# Patient Record
Sex: FEMALE | Race: WHITE | Employment: UNEMPLOYED | URBAN - METROPOLITAN AREA
[De-identification: names, ages, dates, MRNs, and addresses within clinical notes are randomized per-mention and may not be internally consistent; named-entity substitution may affect disease eponyms.]

---

## 2017-01-10 DIAGNOSIS — J45.20 MILD INTERMITTENT ASTHMA WITHOUT COMPLICATION: ICD-10-CM

## 2017-01-10 RX ORDER — BUDESONIDE AND FORMOTEROL FUMARATE DIHYDRATE 80; 4.5 UG/1; UG/1
2 AEROSOL RESPIRATORY (INHALATION) 2 TIMES DAILY
Qty: 1 INHALER | Refills: 3 | Status: SHIPPED | OUTPATIENT
Start: 2017-01-10 | End: 2017-04-04

## 2017-01-16 RX ORDER — ACYCLOVIR 400 MG/1
400 TABLET ORAL PRN
Qty: 40 TABLET | Refills: 2 | Status: SHIPPED | OUTPATIENT
Start: 2017-01-16 | End: 2018-10-02

## 2017-02-13 ENCOUNTER — TELEPHONE (OUTPATIENT)
Dept: PRIMARY CARE CLINIC | Age: 67
End: 2017-02-13

## 2017-02-13 RX ORDER — CEFUROXIME AXETIL 500 MG/1
TABLET ORAL
Qty: 20 TABLET | Refills: 0 | Status: SHIPPED | OUTPATIENT
Start: 2017-02-13 | End: 2017-02-23

## 2017-03-06 ENCOUNTER — OFFICE VISIT (OUTPATIENT)
Dept: PRIMARY CARE CLINIC | Age: 67
End: 2017-03-06

## 2017-03-06 VITALS
WEIGHT: 208 LBS | HEART RATE: 89 BPM | OXYGEN SATURATION: 94 % | DIASTOLIC BLOOD PRESSURE: 84 MMHG | TEMPERATURE: 98.7 F | RESPIRATION RATE: 18 BRPM | HEIGHT: 65 IN | SYSTOLIC BLOOD PRESSURE: 134 MMHG | BODY MASS INDEX: 34.66 KG/M2

## 2017-03-06 DIAGNOSIS — J06.9 ACUTE UPPER RESPIRATORY INFECTION: ICD-10-CM

## 2017-03-06 DIAGNOSIS — E78.2 MIXED HYPERLIPIDEMIA: ICD-10-CM

## 2017-03-06 DIAGNOSIS — J45.20 MILD INTERMITTENT ASTHMA WITHOUT COMPLICATION: Primary | ICD-10-CM

## 2017-03-06 DIAGNOSIS — J30.1 NON-SEASONAL ALLERGIC RHINITIS DUE TO POLLEN: ICD-10-CM

## 2017-03-06 PROCEDURE — G8427 DOCREV CUR MEDS BY ELIG CLIN: HCPCS | Performed by: FAMILY MEDICINE

## 2017-03-06 PROCEDURE — G8419 CALC BMI OUT NRM PARAM NOF/U: HCPCS | Performed by: FAMILY MEDICINE

## 2017-03-06 PROCEDURE — 99214 OFFICE O/P EST MOD 30 MIN: CPT | Performed by: FAMILY MEDICINE

## 2017-03-06 PROCEDURE — 4040F PNEUMOC VAC/ADMIN/RCVD: CPT | Performed by: FAMILY MEDICINE

## 2017-03-06 PROCEDURE — 1036F TOBACCO NON-USER: CPT | Performed by: FAMILY MEDICINE

## 2017-03-06 PROCEDURE — G8484 FLU IMMUNIZE NO ADMIN: HCPCS | Performed by: FAMILY MEDICINE

## 2017-03-06 PROCEDURE — 1090F PRES/ABSN URINE INCON ASSESS: CPT | Performed by: FAMILY MEDICINE

## 2017-03-06 PROCEDURE — G8399 PT W/DXA RESULTS DOCUMENT: HCPCS | Performed by: FAMILY MEDICINE

## 2017-03-06 PROCEDURE — 1123F ACP DISCUSS/DSCN MKR DOCD: CPT | Performed by: FAMILY MEDICINE

## 2017-03-06 PROCEDURE — 3014F SCREEN MAMMO DOC REV: CPT | Performed by: FAMILY MEDICINE

## 2017-03-06 PROCEDURE — 3017F COLORECTAL CA SCREEN DOC REV: CPT | Performed by: FAMILY MEDICINE

## 2017-03-06 RX ORDER — LEVOFLOXACIN 500 MG/1
500 TABLET, FILM COATED ORAL DAILY
Qty: 10 TABLET | Refills: 0 | Status: SHIPPED | OUTPATIENT
Start: 2017-03-06 | End: 2017-03-16

## 2017-03-06 RX ORDER — PREDNISONE 10 MG/1
TABLET ORAL
Qty: 25 TABLET | Refills: 0 | Status: SHIPPED | OUTPATIENT
Start: 2017-03-06 | End: 2017-03-20

## 2017-03-06 ASSESSMENT — ENCOUNTER SYMPTOMS
DIARRHEA: 0
ABDOMINAL PAIN: 0
WHEEZING: 1
SHORTNESS OF BREATH: 1
CONSTIPATION: 0
COUGH: 1

## 2017-03-07 DIAGNOSIS — J30.1 NON-SEASONAL ALLERGIC RHINITIS DUE TO POLLEN: ICD-10-CM

## 2017-03-07 DIAGNOSIS — J45.20 MILD INTERMITTENT ASTHMA WITHOUT COMPLICATION: ICD-10-CM

## 2017-03-07 LAB
ALBUMIN SERPL-MCNC: 4.3 G/DL (ref 3.9–4.9)
ALP BLD-CCNC: 70 U/L (ref 40–130)
ALT SERPL-CCNC: 20 U/L (ref 0–33)
ANION GAP SERPL CALCULATED.3IONS-SCNC: 12 MEQ/L (ref 7–13)
AST SERPL-CCNC: 15 U/L (ref 0–35)
BASOPHILS ABSOLUTE: 0.1 K/UL (ref 0–0.2)
BASOPHILS RELATIVE PERCENT: 0.8 %
BILIRUB SERPL-MCNC: 0.2 MG/DL (ref 0–1.2)
BUN BLDV-MCNC: 10 MG/DL (ref 8–23)
CALCIUM SERPL-MCNC: 9.2 MG/DL (ref 8.6–10.2)
CHLORIDE BLD-SCNC: 102 MEQ/L (ref 98–107)
CO2: 24 MEQ/L (ref 22–29)
CREAT SERPL-MCNC: 0.57 MG/DL (ref 0.5–0.9)
EOSINOPHILS ABSOLUTE: 0.2 K/UL (ref 0–0.7)
EOSINOPHILS RELATIVE PERCENT: 1.5 %
GFR AFRICAN AMERICAN: >60
GFR NON-AFRICAN AMERICAN: >60
GLOBULIN: 2.4 G/DL (ref 2.3–3.5)
GLUCOSE BLD-MCNC: 92 MG/DL (ref 74–109)
HCT VFR BLD CALC: 38.8 % (ref 37–47)
HEMOGLOBIN: 12.8 G/DL (ref 12–16)
LYMPHOCYTES ABSOLUTE: 2.7 K/UL (ref 1–4.8)
LYMPHOCYTES RELATIVE PERCENT: 27.1 %
MCH RBC QN AUTO: 29.3 PG (ref 27–31.3)
MCHC RBC AUTO-ENTMCNC: 32.9 % (ref 33–37)
MCV RBC AUTO: 89.1 FL (ref 82–100)
MONOCYTES ABSOLUTE: 0.8 K/UL (ref 0.2–0.8)
MONOCYTES RELATIVE PERCENT: 7.5 %
NEUTROPHILS ABSOLUTE: 6.4 K/UL (ref 1.4–6.5)
NEUTROPHILS RELATIVE PERCENT: 63.1 %
PDW BLD-RTO: 13.8 % (ref 11.5–14.5)
PLATELET # BLD: 316 K/UL (ref 130–400)
POTASSIUM SERPL-SCNC: 4.1 MEQ/L (ref 3.5–5.1)
RBC # BLD: 4.35 M/UL (ref 4.2–5.4)
SEDIMENTATION RATE, ERYTHROCYTE: 23 MM (ref 0–30)
SODIUM BLD-SCNC: 138 MEQ/L (ref 132–144)
TOTAL PROTEIN: 6.7 G/DL (ref 6.4–8.1)
WBC # BLD: 10.1 K/UL (ref 4.8–10.8)

## 2017-03-11 LAB
2000687N OAK TREE IGE: 0.27 KU/L
ALLERGEN ASPERGILLUS ALTERNATA IGE: 0.16 KU/L
ALLERGEN ASPERGILLUS FUMIGATUS IGE: <0.1 KU/L
ALLERGEN BERMUDA GRASS IGE: <0.1 KU/L
ALLERGEN BIRCH IGE: 0.21 KU/L
ALLERGEN CAT DANDER IGE: 22.2 KU/L
ALLERGEN COMMON SHORT RAGWEED IGE: 0.54 KU/L
ALLERGEN COTTONWOOD: 0.18 KU/L
ALLERGEN COW MILK IGE: 0.12 KU/L
ALLERGEN DOG DANDER IGE: 5.3 KU/L
ALLERGEN ELM IGE: 0.3 KU/L
ALLERGEN ENGLISH PLANTAIN: 0.18 KU/L
ALLERGEN FUNGI/MOLD M.RACEMOSUS IGE: <0.1 KU/L
ALLERGEN GERMAN COCKROACH IGE: <0.1 KU/L
ALLERGEN HORMODENDRUM HORDEI IGE: <0.1 KU/L
ALLERGEN JOHNSON GRASS IGE: 0.12 KU/L
ALLERGEN MAPLE/BOX ELDER IGE: 0.17 KU/L
ALLERGEN MITE DUST FARINAE IGE: 3.58 KU/L
ALLERGEN MITE DUST PTERONYSSINUS IGE: 2.14 KU/L
ALLERGEN MOUNTAIN CEDAR: 0.17 KU/L
ALLERGEN MOUSE EPITHELIA IGE: <0.1 KU/L
ALLERGEN PEANUT (F13) IGE: 0.31 KU/L
ALLERGEN PECAN TREE IGE: 0.25 KU/L
ALLERGEN PENICILLIUM NOTATUM: <0.1 KU/L
ALLERGEN PERENNIAL RYE GRASS IGE: 0.48 KU/L
ALLERGEN ROUGH PIGWEED (W14) IGE: 0.11 KU/L
ALLERGEN RUSSIAN THISTLE IGE: 0.15 KU/L
ALLERGEN SEE NOTE: NORMAL
ALLERGEN SHEEP SORREL (W18) IGE: 0.14 KU/L
ALLERGEN TIMOTHY GRASS: 0.54 KU/L
ALLERGEN TREE SYCAMORE: <0.1 KU/L
ALLERGEN WALNUT TREE IGE: 0.26 KU/L
ALLERGEN WHITE MULBERRY TREE, IGE: <0.1 KU/L
ALLERGEN, TREE, WHITE ASH IGE: 0.19 KU/L
IGE: 295 KU/L

## 2017-03-14 ENCOUNTER — TELEPHONE (OUTPATIENT)
Dept: PRIMARY CARE CLINIC | Age: 67
End: 2017-03-14

## 2017-03-16 ENCOUNTER — HOSPITAL ENCOUNTER (OUTPATIENT)
Dept: CT IMAGING | Age: 67
Discharge: HOME | End: 2017-03-16
Payer: MEDICARE

## 2017-03-16 DIAGNOSIS — J45.20 MILD INTERMITTENT ASTHMA WITHOUT COMPLICATION: ICD-10-CM

## 2017-03-16 PROCEDURE — 71250 CT THORAX DX C-: CPT

## 2017-04-04 ENCOUNTER — OFFICE VISIT (OUTPATIENT)
Dept: PRIMARY CARE CLINIC | Age: 67
End: 2017-04-04

## 2017-04-04 VITALS
DIASTOLIC BLOOD PRESSURE: 84 MMHG | TEMPERATURE: 95.7 F | SYSTOLIC BLOOD PRESSURE: 122 MMHG | RESPIRATION RATE: 16 BRPM | HEART RATE: 85 BPM | WEIGHT: 215.9 LBS | HEIGHT: 65 IN | BODY MASS INDEX: 35.97 KG/M2 | OXYGEN SATURATION: 96 %

## 2017-04-04 DIAGNOSIS — J30.2 SEASONAL ALLERGIC RHINITIS, UNSPECIFIED ALLERGIC RHINITIS TRIGGER: ICD-10-CM

## 2017-04-04 DIAGNOSIS — E78.2 MIXED HYPERLIPIDEMIA: ICD-10-CM

## 2017-04-04 DIAGNOSIS — F32.0 MILD SINGLE CURRENT EPISODE OF MAJOR DEPRESSIVE DISORDER (HCC): ICD-10-CM

## 2017-04-04 DIAGNOSIS — J45.20 MILD INTERMITTENT ASTHMA WITHOUT COMPLICATION: ICD-10-CM

## 2017-04-04 DIAGNOSIS — M81.0 OSTEOPOROSIS: ICD-10-CM

## 2017-04-04 DIAGNOSIS — Z00.00 ANNUAL PHYSICAL EXAM: Primary | ICD-10-CM

## 2017-04-04 PROCEDURE — 1036F TOBACCO NON-USER: CPT | Performed by: FAMILY MEDICINE

## 2017-04-04 PROCEDURE — 1123F ACP DISCUSS/DSCN MKR DOCD: CPT | Performed by: FAMILY MEDICINE

## 2017-04-04 PROCEDURE — 3014F SCREEN MAMMO DOC REV: CPT | Performed by: FAMILY MEDICINE

## 2017-04-04 PROCEDURE — 1090F PRES/ABSN URINE INCON ASSESS: CPT | Performed by: FAMILY MEDICINE

## 2017-04-04 PROCEDURE — G8419 CALC BMI OUT NRM PARAM NOF/U: HCPCS | Performed by: FAMILY MEDICINE

## 2017-04-04 PROCEDURE — 4005F PHARM THX FOR OP RXD: CPT | Performed by: FAMILY MEDICINE

## 2017-04-04 PROCEDURE — 99214 OFFICE O/P EST MOD 30 MIN: CPT | Performed by: FAMILY MEDICINE

## 2017-04-04 PROCEDURE — G8399 PT W/DXA RESULTS DOCUMENT: HCPCS | Performed by: FAMILY MEDICINE

## 2017-04-04 PROCEDURE — G8427 DOCREV CUR MEDS BY ELIG CLIN: HCPCS | Performed by: FAMILY MEDICINE

## 2017-04-04 PROCEDURE — 4040F PNEUMOC VAC/ADMIN/RCVD: CPT | Performed by: FAMILY MEDICINE

## 2017-04-04 PROCEDURE — 3017F COLORECTAL CA SCREEN DOC REV: CPT | Performed by: FAMILY MEDICINE

## 2017-04-04 RX ORDER — FLUTICASONE FUROATE AND VILANTEROL 100; 25 UG/1; UG/1
POWDER RESPIRATORY (INHALATION) DAILY
COMMUNITY
End: 2017-04-04 | Stop reason: SDUPTHER

## 2017-04-04 RX ORDER — ALBUTEROL SULFATE 2.5 MG/3ML
2.5 SOLUTION RESPIRATORY (INHALATION) EVERY 6 HOURS PRN
COMMUNITY
End: 2017-04-04 | Stop reason: SDUPTHER

## 2017-04-04 RX ORDER — ALBUTEROL SULFATE 2.5 MG/3ML
2.5 SOLUTION RESPIRATORY (INHALATION) EVERY 6 HOURS PRN
Qty: 120 EACH | Refills: 3 | Status: SHIPPED | OUTPATIENT
Start: 2017-04-04 | End: 2017-11-29 | Stop reason: SDUPTHER

## 2017-04-04 RX ORDER — FLUTICASONE FUROATE AND VILANTEROL 100; 25 UG/1; UG/1
1 POWDER RESPIRATORY (INHALATION) DAILY
Qty: 1 EACH | Refills: 3 | Status: SHIPPED | OUTPATIENT
Start: 2017-04-04 | End: 2017-05-16

## 2017-04-04 RX ORDER — FEXOFENADINE HCL 180 MG/1
180 TABLET ORAL DAILY
Qty: 30 TABLET | Refills: 3 | Status: SHIPPED | OUTPATIENT
Start: 2017-04-04 | End: 2017-10-02

## 2017-04-04 ASSESSMENT — ENCOUNTER SYMPTOMS
RHINORRHEA: 0
SHORTNESS OF BREATH: 0
CHEST TIGHTNESS: 0
TROUBLE SWALLOWING: 0
DIFFICULTY BREATHING: 0
HEARTBURN: 0
COUGH: 0
CONSTIPATION: 0
HOARSE VOICE: 1
ABDOMINAL PAIN: 0
DIARRHEA: 0
SPUTUM PRODUCTION: 0
WHEEZING: 0

## 2017-04-24 ENCOUNTER — HOSPITAL ENCOUNTER (OUTPATIENT)
Dept: WOMENS IMAGING | Age: 67
Discharge: HOME | End: 2017-04-24
Payer: MEDICARE

## 2017-04-24 DIAGNOSIS — M81.0 OSTEOPOROSIS: ICD-10-CM

## 2017-04-24 PROCEDURE — 77080 DXA BONE DENSITY AXIAL: CPT

## 2017-05-16 ENCOUNTER — OFFICE VISIT (OUTPATIENT)
Dept: PRIMARY CARE CLINIC | Age: 67
End: 2017-05-16

## 2017-05-16 VITALS
OXYGEN SATURATION: 96 % | HEART RATE: 83 BPM | BODY MASS INDEX: 36.44 KG/M2 | WEIGHT: 218.7 LBS | DIASTOLIC BLOOD PRESSURE: 78 MMHG | TEMPERATURE: 96.5 F | HEIGHT: 65 IN | SYSTOLIC BLOOD PRESSURE: 128 MMHG

## 2017-05-16 DIAGNOSIS — T14.8XXA BRUISING: ICD-10-CM

## 2017-05-16 DIAGNOSIS — J45.20 MILD INTERMITTENT ASTHMA WITHOUT COMPLICATION: ICD-10-CM

## 2017-05-16 DIAGNOSIS — T14.8XXA BRUISING: Primary | ICD-10-CM

## 2017-05-16 DIAGNOSIS — E78.2 MIXED HYPERLIPIDEMIA: ICD-10-CM

## 2017-05-16 DIAGNOSIS — R60.9 PERIPHERAL EDEMA: ICD-10-CM

## 2017-05-16 LAB
APTT: 26.2 SEC (ref 21.6–35.4)
BASOPHILS ABSOLUTE: 0 K/UL (ref 0–0.2)
BASOPHILS RELATIVE PERCENT: 0.2 %
BILIRUBIN, POC: ABNORMAL
BLOOD URINE, POC: ABNORMAL
CLARITY, POC: ABNORMAL
COLOR, POC: YELLOW
EOSINOPHILS ABSOLUTE: 1.3 K/UL (ref 0–0.7)
EOSINOPHILS RELATIVE PERCENT: 15.8 %
GLUCOSE URINE, POC: ABNORMAL
HCT VFR BLD CALC: 37.1 % (ref 37–47)
HEMOGLOBIN: 11.7 G/DL (ref 12–16)
KETONES, POC: ABNORMAL
LEUKOCYTE EST, POC: ABNORMAL
LYMPHOCYTES ABSOLUTE: 2.2 K/UL (ref 1–4.8)
LYMPHOCYTES RELATIVE PERCENT: 26.1 %
MCH RBC QN AUTO: 29.2 PG (ref 27–31.3)
MCHC RBC AUTO-ENTMCNC: 31.7 % (ref 33–37)
MCV RBC AUTO: 92.1 FL (ref 82–100)
MONOCYTES ABSOLUTE: 0.7 K/UL (ref 0.2–0.8)
MONOCYTES RELATIVE PERCENT: 8 %
NEUTROPHILS ABSOLUTE: 4.2 K/UL (ref 1.4–6.5)
NEUTROPHILS RELATIVE PERCENT: 49.9 %
NITRITE, POC: ABNORMAL
PDW BLD-RTO: 14 % (ref 11.5–14.5)
PH, POC: 6
PLATELET # BLD: 283 K/UL (ref 130–400)
PROTEIN, POC: ABNORMAL
RBC # BLD: 4.03 M/UL (ref 4.2–5.4)
SPECIFIC GRAVITY, POC: 1.02
UROBILINOGEN, POC: ABNORMAL
WBC # BLD: 8.5 K/UL (ref 4.8–10.8)

## 2017-05-16 PROCEDURE — 4040F PNEUMOC VAC/ADMIN/RCVD: CPT | Performed by: FAMILY MEDICINE

## 2017-05-16 PROCEDURE — 3017F COLORECTAL CA SCREEN DOC REV: CPT | Performed by: FAMILY MEDICINE

## 2017-05-16 PROCEDURE — G8399 PT W/DXA RESULTS DOCUMENT: HCPCS | Performed by: FAMILY MEDICINE

## 2017-05-16 PROCEDURE — 3014F SCREEN MAMMO DOC REV: CPT | Performed by: FAMILY MEDICINE

## 2017-05-16 PROCEDURE — G8427 DOCREV CUR MEDS BY ELIG CLIN: HCPCS | Performed by: FAMILY MEDICINE

## 2017-05-16 PROCEDURE — 1090F PRES/ABSN URINE INCON ASSESS: CPT | Performed by: FAMILY MEDICINE

## 2017-05-16 PROCEDURE — G8419 CALC BMI OUT NRM PARAM NOF/U: HCPCS | Performed by: FAMILY MEDICINE

## 2017-05-16 PROCEDURE — 99214 OFFICE O/P EST MOD 30 MIN: CPT | Performed by: FAMILY MEDICINE

## 2017-05-16 PROCEDURE — 1123F ACP DISCUSS/DSCN MKR DOCD: CPT | Performed by: FAMILY MEDICINE

## 2017-05-16 PROCEDURE — 1036F TOBACCO NON-USER: CPT | Performed by: FAMILY MEDICINE

## 2017-05-16 PROCEDURE — 81002 URINALYSIS NONAUTO W/O SCOPE: CPT | Performed by: FAMILY MEDICINE

## 2017-05-16 RX ORDER — FUROSEMIDE 20 MG/1
20 TABLET ORAL DAILY
Qty: 30 TABLET | Refills: 0 | Status: SHIPPED | OUTPATIENT
Start: 2017-05-16 | End: 2017-10-02

## 2017-05-16 ASSESSMENT — ENCOUNTER SYMPTOMS
SHORTNESS OF BREATH: 0
WHEEZING: 0
COUGH: 0
BACK PAIN: 0
ABDOMINAL PAIN: 0
NAUSEA: 0
VOMITING: 0
CONSTIPATION: 0
RESPIRATORY NEGATIVE: 1
DIARRHEA: 0
SORE THROAT: 0
SINUS PRESSURE: 0

## 2017-05-16 ASSESSMENT — PATIENT HEALTH QUESTIONNAIRE - PHQ9
2. FEELING DOWN, DEPRESSED OR HOPELESS: 1
SUM OF ALL RESPONSES TO PHQ QUESTIONS 1-9: 1
1. LITTLE INTEREST OR PLEASURE IN DOING THINGS: 0
SUM OF ALL RESPONSES TO PHQ9 QUESTIONS 1 & 2: 1

## 2017-05-31 ENCOUNTER — CARE COORDINATION (OUTPATIENT)
Dept: CARE COORDINATION | Age: 67
End: 2017-05-31

## 2017-06-06 ENCOUNTER — OFFICE VISIT (OUTPATIENT)
Dept: PRIMARY CARE CLINIC | Age: 67
End: 2017-06-06

## 2017-06-06 VITALS
RESPIRATION RATE: 16 BRPM | SYSTOLIC BLOOD PRESSURE: 104 MMHG | OXYGEN SATURATION: 97 % | BODY MASS INDEX: 35.49 KG/M2 | HEART RATE: 87 BPM | TEMPERATURE: 97.4 F | HEIGHT: 65 IN | WEIGHT: 213 LBS | DIASTOLIC BLOOD PRESSURE: 66 MMHG

## 2017-06-06 DIAGNOSIS — F32.0 MILD SINGLE CURRENT EPISODE OF MAJOR DEPRESSIVE DISORDER (HCC): ICD-10-CM

## 2017-06-06 DIAGNOSIS — J18.0 BRONCHOPNEUMONIA: ICD-10-CM

## 2017-06-06 DIAGNOSIS — J45.20 MILD INTERMITTENT ASTHMA WITHOUT COMPLICATION: Primary | ICD-10-CM

## 2017-06-06 DIAGNOSIS — J30.81 CAT ALLERGIES: ICD-10-CM

## 2017-06-06 DIAGNOSIS — Z91.09 ENVIRONMENTAL ALLERGIES: ICD-10-CM

## 2017-06-06 PROCEDURE — 99214 OFFICE O/P EST MOD 30 MIN: CPT | Performed by: FAMILY MEDICINE

## 2017-06-06 RX ORDER — PREDNISONE 10 MG/1
TABLET ORAL
Refills: 0 | COMMUNITY
Start: 2017-05-30 | End: 2017-07-01

## 2017-06-06 RX ORDER — VILAZODONE HYDROCHLORIDE 20 MG/1
20 TABLET ORAL DAILY
Qty: 30 TABLET | Refills: 2
Start: 2017-06-06 | End: 2017-07-01

## 2017-06-06 RX ORDER — BUDESONIDE AND FORMOTEROL FUMARATE DIHYDRATE 160; 4.5 UG/1; UG/1
2 AEROSOL RESPIRATORY (INHALATION) 2 TIMES DAILY
Qty: 3 INHALER | Refills: 1 | Status: SHIPPED | OUTPATIENT
Start: 2017-06-06 | End: 2018-03-08

## 2017-06-06 RX ORDER — VENLAFAXINE HYDROCHLORIDE 37.5 MG/1
37.5 CAPSULE, EXTENDED RELEASE ORAL DAILY
Qty: 30 CAPSULE | Refills: 3 | Status: SHIPPED | OUTPATIENT
Start: 2017-06-06 | End: 2017-07-24

## 2017-06-06 RX ORDER — LEVOFLOXACIN 750 MG/1
TABLET ORAL
Refills: 0 | COMMUNITY
Start: 2017-05-30 | End: 2017-07-01

## 2017-06-06 RX ORDER — MONTELUKAST SODIUM 10 MG/1
10 TABLET ORAL DAILY
Qty: 90 TABLET | Refills: 1 | Status: SHIPPED | OUTPATIENT
Start: 2017-06-06 | End: 2017-12-07 | Stop reason: SDUPTHER

## 2017-06-06 ASSESSMENT — ENCOUNTER SYMPTOMS
BACK PAIN: 0
SHORTNESS OF BREATH: 0
CHOKING: 0

## 2017-07-01 ENCOUNTER — OFFICE VISIT (OUTPATIENT)
Dept: PRIMARY CARE CLINIC | Age: 67
End: 2017-07-01

## 2017-07-01 VITALS
DIASTOLIC BLOOD PRESSURE: 80 MMHG | WEIGHT: 216.7 LBS | HEART RATE: 81 BPM | SYSTOLIC BLOOD PRESSURE: 118 MMHG | BODY MASS INDEX: 36.1 KG/M2 | RESPIRATION RATE: 14 BRPM | HEIGHT: 65 IN | OXYGEN SATURATION: 97 % | TEMPERATURE: 97.9 F

## 2017-07-01 DIAGNOSIS — J30.1 NON-SEASONAL ALLERGIC RHINITIS DUE TO POLLEN: ICD-10-CM

## 2017-07-01 DIAGNOSIS — J45.20 MILD INTERMITTENT ASTHMA WITHOUT COMPLICATION: Primary | ICD-10-CM

## 2017-07-01 DIAGNOSIS — F32.0 MILD SINGLE CURRENT EPISODE OF MAJOR DEPRESSIVE DISORDER (HCC): ICD-10-CM

## 2017-07-01 PROCEDURE — 1090F PRES/ABSN URINE INCON ASSESS: CPT | Performed by: FAMILY MEDICINE

## 2017-07-01 PROCEDURE — 3014F SCREEN MAMMO DOC REV: CPT | Performed by: FAMILY MEDICINE

## 2017-07-01 PROCEDURE — 3017F COLORECTAL CA SCREEN DOC REV: CPT | Performed by: FAMILY MEDICINE

## 2017-07-01 PROCEDURE — 1123F ACP DISCUSS/DSCN MKR DOCD: CPT | Performed by: FAMILY MEDICINE

## 2017-07-01 PROCEDURE — G8427 DOCREV CUR MEDS BY ELIG CLIN: HCPCS | Performed by: FAMILY MEDICINE

## 2017-07-01 PROCEDURE — G8399 PT W/DXA RESULTS DOCUMENT: HCPCS | Performed by: FAMILY MEDICINE

## 2017-07-01 PROCEDURE — 4040F PNEUMOC VAC/ADMIN/RCVD: CPT | Performed by: FAMILY MEDICINE

## 2017-07-01 PROCEDURE — 99214 OFFICE O/P EST MOD 30 MIN: CPT | Performed by: FAMILY MEDICINE

## 2017-07-01 PROCEDURE — G8419 CALC BMI OUT NRM PARAM NOF/U: HCPCS | Performed by: FAMILY MEDICINE

## 2017-07-01 PROCEDURE — 1036F TOBACCO NON-USER: CPT | Performed by: FAMILY MEDICINE

## 2017-07-01 RX ORDER — FLUTICASONE PROPIONATE 50 MCG
1 SPRAY, SUSPENSION (ML) NASAL DAILY
Qty: 1 BOTTLE | Refills: 3 | Status: SHIPPED | OUTPATIENT
Start: 2017-07-01

## 2017-07-16 ASSESSMENT — ENCOUNTER SYMPTOMS
ABDOMINAL PAIN: 0
SHORTNESS OF BREATH: 0
CHEST TIGHTNESS: 0
COUGH: 0

## 2017-07-24 ENCOUNTER — OFFICE VISIT (OUTPATIENT)
Dept: PRIMARY CARE CLINIC | Age: 67
End: 2017-07-24

## 2017-07-24 VITALS
OXYGEN SATURATION: 98 % | WEIGHT: 216 LBS | SYSTOLIC BLOOD PRESSURE: 108 MMHG | RESPIRATION RATE: 16 BRPM | HEART RATE: 72 BPM | DIASTOLIC BLOOD PRESSURE: 64 MMHG | HEIGHT: 65 IN | BODY MASS INDEX: 35.99 KG/M2 | TEMPERATURE: 97.8 F

## 2017-07-24 DIAGNOSIS — J45.21 MILD INTERMITTENT ASTHMA WITH ACUTE EXACERBATION: Primary | ICD-10-CM

## 2017-07-24 DIAGNOSIS — J30.1 NON-SEASONAL ALLERGIC RHINITIS DUE TO POLLEN: ICD-10-CM

## 2017-07-24 DIAGNOSIS — J06.9 ACUTE UPPER RESPIRATORY INFECTION: ICD-10-CM

## 2017-07-24 DIAGNOSIS — F32.0 MILD SINGLE CURRENT EPISODE OF MAJOR DEPRESSIVE DISORDER (HCC): ICD-10-CM

## 2017-07-24 PROCEDURE — 99214 OFFICE O/P EST MOD 30 MIN: CPT | Performed by: FAMILY MEDICINE

## 2017-07-24 PROCEDURE — 4040F PNEUMOC VAC/ADMIN/RCVD: CPT | Performed by: FAMILY MEDICINE

## 2017-07-24 PROCEDURE — 1036F TOBACCO NON-USER: CPT | Performed by: FAMILY MEDICINE

## 2017-07-24 PROCEDURE — 1090F PRES/ABSN URINE INCON ASSESS: CPT | Performed by: FAMILY MEDICINE

## 2017-07-24 PROCEDURE — G8419 CALC BMI OUT NRM PARAM NOF/U: HCPCS | Performed by: FAMILY MEDICINE

## 2017-07-24 PROCEDURE — G8427 DOCREV CUR MEDS BY ELIG CLIN: HCPCS | Performed by: FAMILY MEDICINE

## 2017-07-24 PROCEDURE — 96372 THER/PROPH/DIAG INJ SC/IM: CPT | Performed by: FAMILY MEDICINE

## 2017-07-24 PROCEDURE — 1123F ACP DISCUSS/DSCN MKR DOCD: CPT | Performed by: FAMILY MEDICINE

## 2017-07-24 PROCEDURE — G8399 PT W/DXA RESULTS DOCUMENT: HCPCS | Performed by: FAMILY MEDICINE

## 2017-07-24 PROCEDURE — 3017F COLORECTAL CA SCREEN DOC REV: CPT | Performed by: FAMILY MEDICINE

## 2017-07-24 PROCEDURE — 3014F SCREEN MAMMO DOC REV: CPT | Performed by: FAMILY MEDICINE

## 2017-07-24 RX ORDER — PREDNISONE 10 MG/1
TABLET ORAL
Qty: 20 TABLET | Refills: 0 | Status: SHIPPED | OUTPATIENT
Start: 2017-07-24 | End: 2017-08-07

## 2017-07-24 RX ORDER — CEFUROXIME AXETIL 500 MG/1
TABLET ORAL
Qty: 20 TABLET | Refills: 0 | Status: SHIPPED | OUTPATIENT
Start: 2017-07-24 | End: 2017-08-03

## 2017-07-24 RX ORDER — CEFTRIAXONE 1 G/1
1 INJECTION, POWDER, FOR SOLUTION INTRAMUSCULAR; INTRAVENOUS ONCE
Status: COMPLETED | OUTPATIENT
Start: 2017-07-24 | End: 2017-07-24

## 2017-07-24 RX ADMIN — CEFTRIAXONE 1 G: 1 INJECTION, POWDER, FOR SOLUTION INTRAMUSCULAR; INTRAVENOUS at 16:31

## 2017-07-24 ASSESSMENT — ENCOUNTER SYMPTOMS
SORE THROAT: 0
SINUS PAIN: 0
CHEST TIGHTNESS: 1
VOMITING: 0
SHORTNESS OF BREATH: 1
CONSTIPATION: 0
BACK PAIN: 0
WHEEZING: 1
NAUSEA: 0
DIARRHEA: 0
RHINORRHEA: 0
SINUS PRESSURE: 0
SWOLLEN GLANDS: 0
ABDOMINAL PAIN: 0
COUGH: 1

## 2017-09-22 ENCOUNTER — OFFICE VISIT (OUTPATIENT)
Dept: PRIMARY CARE CLINIC | Age: 67
End: 2017-09-22

## 2017-09-22 VITALS
TEMPERATURE: 97.7 F | RESPIRATION RATE: 20 BRPM | HEART RATE: 88 BPM | DIASTOLIC BLOOD PRESSURE: 66 MMHG | BODY MASS INDEX: 37.15 KG/M2 | OXYGEN SATURATION: 95 % | SYSTOLIC BLOOD PRESSURE: 106 MMHG | HEIGHT: 65 IN | WEIGHT: 223 LBS

## 2017-09-22 DIAGNOSIS — J45.20 MILD INTERMITTENT ASTHMA WITHOUT COMPLICATION: Primary | ICD-10-CM

## 2017-09-22 DIAGNOSIS — E78.2 MIXED HYPERLIPIDEMIA: ICD-10-CM

## 2017-09-22 PROCEDURE — 1036F TOBACCO NON-USER: CPT | Performed by: FAMILY MEDICINE

## 2017-09-22 PROCEDURE — G8427 DOCREV CUR MEDS BY ELIG CLIN: HCPCS | Performed by: FAMILY MEDICINE

## 2017-09-22 PROCEDURE — 3017F COLORECTAL CA SCREEN DOC REV: CPT | Performed by: FAMILY MEDICINE

## 2017-09-22 PROCEDURE — G8399 PT W/DXA RESULTS DOCUMENT: HCPCS | Performed by: FAMILY MEDICINE

## 2017-09-22 PROCEDURE — 1090F PRES/ABSN URINE INCON ASSESS: CPT | Performed by: FAMILY MEDICINE

## 2017-09-22 PROCEDURE — 1123F ACP DISCUSS/DSCN MKR DOCD: CPT | Performed by: FAMILY MEDICINE

## 2017-09-22 PROCEDURE — 3014F SCREEN MAMMO DOC REV: CPT | Performed by: FAMILY MEDICINE

## 2017-09-22 PROCEDURE — G8417 CALC BMI ABV UP PARAM F/U: HCPCS | Performed by: FAMILY MEDICINE

## 2017-09-22 PROCEDURE — 4040F PNEUMOC VAC/ADMIN/RCVD: CPT | Performed by: FAMILY MEDICINE

## 2017-09-22 PROCEDURE — 99214 OFFICE O/P EST MOD 30 MIN: CPT | Performed by: FAMILY MEDICINE

## 2017-09-22 ASSESSMENT — ENCOUNTER SYMPTOMS
COUGH: 0
NAUSEA: 0
SORE THROAT: 0
DIARRHEA: 0
VOMITING: 0
BACK PAIN: 0
ABDOMINAL PAIN: 0
CONSTIPATION: 0
WHEEZING: 0
SINUS PRESSURE: 0

## 2017-09-27 ENCOUNTER — TELEPHONE (OUTPATIENT)
Dept: PRIMARY CARE CLINIC | Age: 67
End: 2017-09-27

## 2017-09-27 DIAGNOSIS — N28.89 LEFT RENAL MASS: Primary | ICD-10-CM

## 2017-09-30 ASSESSMENT — ENCOUNTER SYMPTOMS
CHEST TIGHTNESS: 0
SHORTNESS OF BREATH: 1

## 2017-10-02 ENCOUNTER — OFFICE VISIT (OUTPATIENT)
Dept: PRIMARY CARE CLINIC | Age: 67
End: 2017-10-02

## 2017-10-02 VITALS
DIASTOLIC BLOOD PRESSURE: 64 MMHG | SYSTOLIC BLOOD PRESSURE: 122 MMHG | RESPIRATION RATE: 18 BRPM | HEART RATE: 97 BPM | HEIGHT: 65 IN | OXYGEN SATURATION: 92 % | BODY MASS INDEX: 37.15 KG/M2 | WEIGHT: 223 LBS | TEMPERATURE: 99 F

## 2017-10-02 DIAGNOSIS — J45.21 MILD INTERMITTENT ASTHMA WITH ACUTE EXACERBATION: Primary | ICD-10-CM

## 2017-10-02 DIAGNOSIS — J30.1 NON-SEASONAL ALLERGIC RHINITIS DUE TO POLLEN: ICD-10-CM

## 2017-10-02 PROCEDURE — 3017F COLORECTAL CA SCREEN DOC REV: CPT | Performed by: FAMILY MEDICINE

## 2017-10-02 PROCEDURE — 1036F TOBACCO NON-USER: CPT | Performed by: FAMILY MEDICINE

## 2017-10-02 PROCEDURE — G8399 PT W/DXA RESULTS DOCUMENT: HCPCS | Performed by: FAMILY MEDICINE

## 2017-10-02 PROCEDURE — 99214 OFFICE O/P EST MOD 30 MIN: CPT | Performed by: FAMILY MEDICINE

## 2017-10-02 PROCEDURE — G8484 FLU IMMUNIZE NO ADMIN: HCPCS | Performed by: FAMILY MEDICINE

## 2017-10-02 PROCEDURE — G8427 DOCREV CUR MEDS BY ELIG CLIN: HCPCS | Performed by: FAMILY MEDICINE

## 2017-10-02 PROCEDURE — 1123F ACP DISCUSS/DSCN MKR DOCD: CPT | Performed by: FAMILY MEDICINE

## 2017-10-02 PROCEDURE — 4040F PNEUMOC VAC/ADMIN/RCVD: CPT | Performed by: FAMILY MEDICINE

## 2017-10-02 PROCEDURE — 3014F SCREEN MAMMO DOC REV: CPT | Performed by: FAMILY MEDICINE

## 2017-10-02 PROCEDURE — G8417 CALC BMI ABV UP PARAM F/U: HCPCS | Performed by: FAMILY MEDICINE

## 2017-10-02 PROCEDURE — 1090F PRES/ABSN URINE INCON ASSESS: CPT | Performed by: FAMILY MEDICINE

## 2017-10-02 PROCEDURE — 94640 AIRWAY INHALATION TREATMENT: CPT | Performed by: FAMILY MEDICINE

## 2017-10-02 RX ORDER — PREDNISONE 10 MG/1
TABLET ORAL
Refills: 4 | COMMUNITY
Start: 2017-09-22 | End: 2017-10-25

## 2017-10-02 RX ORDER — ALBUTEROL SULFATE 2.5 MG/3ML
2.5 SOLUTION RESPIRATORY (INHALATION) ONCE
Status: COMPLETED | OUTPATIENT
Start: 2017-10-02 | End: 2017-10-02

## 2017-10-02 RX ADMIN — ALBUTEROL SULFATE 2.5 MG: 2.5 SOLUTION RESPIRATORY (INHALATION) at 13:45

## 2017-10-02 ASSESSMENT — ENCOUNTER SYMPTOMS
ABDOMINAL PAIN: 0
SORE THROAT: 0
SHORTNESS OF BREATH: 1
HEMOPTYSIS: 0
RHINORRHEA: 0
NAUSEA: 0
DIARRHEA: 0
SINUS PRESSURE: 0
VOMITING: 0
WHEEZING: 1
CONSTIPATION: 0
SPUTUM PRODUCTION: 0
SWOLLEN GLANDS: 0
COUGH: 1
HEARTBURN: 0
BACK PAIN: 0

## 2017-10-02 NOTE — PROGRESS NOTES
Subjective:      Patient ID: Nirmal Dai is a 77 y.o. female who presents today for:  Chief Complaint   Patient presents with    Shortness of Breath     Patient c/o SOB since Saturday.  Cough     Patient c/o cough and wheezing       Shortness of Breath   This is a new problem. The current episode started in the past 7 days. The problem occurs constantly. The problem has been unchanged. Associated symptoms include wheezing. Pertinent negatives include no abdominal pain, chest pain, ear pain, fever, headaches, hemoptysis, leg pain, neck pain, rhinorrhea, sore throat, sputum production, swollen glands or vomiting. The symptoms are aggravated by weather changes. Treatments tried: Singulair, The treatment provided no relief. Her past medical history is significant for asthma. There is no history of bronchiolitis, CAD, chronic lung disease, a heart failure, PE, pneumonia or a recent surgery. Cough   This is a new problem. The current episode started in the past 7 days. The problem has been unchanged. Associated symptoms include shortness of breath and wheezing. Pertinent negatives include no chest pain, chills, ear pain, fever, headaches, heartburn, hemoptysis, myalgias, nasal congestion, postnasal drip, rhinorrhea or sore throat. Treatments tried: Symbacort. Her past medical history is significant for asthma. There is no history of bronchiectasis, bronchitis or pneumonia.        Past Medical History:   Diagnosis Date    Allergic rhinitis     Anemia     Asthma     Depression     Hyperlipidemia      Past Surgical History:   Procedure Laterality Date    COLONOSCOPY  09/17/2012    DILATION AND CURETTAGE OF UTERUS      TONSILLECTOMY AND ADENOIDECTOMY      TUBAL LIGATION       Family History   Problem Relation Age of Onset    Cancer Mother      breast    Emphysema Father      Social History     Social History    Marital status:      Spouse name: N/A    Number of children: N/A    Years of range of motion. She exhibits no edema or tenderness. Lymphadenopathy:     She has no cervical adenopathy. Neurological: She is alert and oriented to person, place, and time. She has normal reflexes. Coordination normal.   Skin: Skin is warm and dry. No rash noted. Psychiatric: She has a normal mood and affect. Thought content normal.   Nursing note and vitals reviewed. Assessment:     1. Mild intermittent asthma with acute exacerbation  predniSONE (DELTASONE) 10 MG tablet    90988 - CO INHAL RX, AIRWAY OBST/DX SPUTUM INDUCT    albuterol (PROVENTIL) nebulizer solution 2.5 mg   2. Non-seasonal allergic rhinitis due to pollen         Plan:      Orders Placed This Encounter   Procedures    95948 - CO INHAL RX, AIRWAY OBST/DX SPUTUM INDUCT     Orders Placed This Encounter   Medications    albuterol (PROVENTIL) nebulizer solution 2.5 mg       Controlled Substances Monitoring:      Return in about 3 weeks (around 10/23/2017) for Review of Labs & Diagnostic Testing, Routine follow up. I, Margette Canavan ([address] [place] [address])  , am scribing for and in the presence of Michela Orellana DO. Electronically signed by :  Margette Canavan ([address] [place] [address])      Sheree Cristina DO, personally performed the services described in this documentation, as scribed by Haily Diane in my presence, and it is both accurate and complete.  Electronically signed by: Michela Orellana DO    10/2/17 9:47 PM    Michela Orellana DO

## 2017-10-05 RX ORDER — DEXTROMETHORPHAN POLISTIREX 30 MG/5ML
60 SUSPENSION ORAL 2 TIMES DAILY PRN
Qty: 240 ML | Refills: 1 | Status: SHIPPED | OUTPATIENT
Start: 2017-10-05 | End: 2017-10-15

## 2017-10-12 ENCOUNTER — HOSPITAL ENCOUNTER (OUTPATIENT)
Dept: CT IMAGING | Age: 67
Discharge: HOME | End: 2017-10-12
Payer: MEDICARE

## 2017-10-12 DIAGNOSIS — J45.20 MILD INTERMITTENT ASTHMA WITHOUT COMPLICATION: ICD-10-CM

## 2017-10-12 PROCEDURE — 6360000004 HC RX CONTRAST MEDICATION: Performed by: RADIOLOGY

## 2017-10-12 PROCEDURE — 74177 CT ABD & PELVIS W/CONTRAST: CPT

## 2017-10-12 PROCEDURE — 71260 CT THORAX DX C+: CPT

## 2017-10-12 PROCEDURE — 2500000003 HC RX 250 WO HCPCS: Performed by: RADIOLOGY

## 2017-10-12 RX ADMIN — IOPAMIDOL 100 ML: 755 INJECTION, SOLUTION INTRAVENOUS at 10:02

## 2017-10-12 RX ADMIN — BARIUM SULFATE 450 ML: 21 SUSPENSION ORAL at 10:02

## 2017-10-13 ENCOUNTER — TELEPHONE (OUTPATIENT)
Dept: PRIMARY CARE CLINIC | Age: 67
End: 2017-10-13

## 2017-10-14 NOTE — TELEPHONE ENCOUNTER
I would recommend seeing the cardiologist and the pulmonologist before the colonoscopy as she'll need to be cleared for surgery/anesthesia for the colonoscopy.

## 2017-10-14 NOTE — TELEPHONE ENCOUNTER
Pt called back and is aware of imaging results. She has a follow up with cardiology, pulmonologist and Dr. Rebeca Nunez. She has to verify that she is stable enough to have a colonoscopy. Once she does, she will schedule with Dr. Marie Mazariegos at [facility], [facility]. herself.

## 2017-10-25 ENCOUNTER — OFFICE VISIT (OUTPATIENT)
Dept: PRIMARY CARE CLINIC | Age: 67
End: 2017-10-25

## 2017-10-25 VITALS
HEIGHT: 65 IN | BODY MASS INDEX: 37.32 KG/M2 | SYSTOLIC BLOOD PRESSURE: 120 MMHG | RESPIRATION RATE: 20 BRPM | WEIGHT: 224 LBS | DIASTOLIC BLOOD PRESSURE: 70 MMHG | HEART RATE: 80 BPM | TEMPERATURE: 98.6 F

## 2017-10-25 DIAGNOSIS — E78.2 MIXED HYPERLIPIDEMIA: ICD-10-CM

## 2017-10-25 DIAGNOSIS — J30.1 ACUTE NONSEASONAL ALLERGIC RHINITIS DUE TO POLLEN: ICD-10-CM

## 2017-10-25 DIAGNOSIS — J45.20 MILD INTERMITTENT ASTHMA WITHOUT COMPLICATION: Primary | ICD-10-CM

## 2017-10-25 DIAGNOSIS — F32.0 MILD SINGLE CURRENT EPISODE OF MAJOR DEPRESSIVE DISORDER (HCC): ICD-10-CM

## 2017-10-25 PROCEDURE — 99496 TRANSJ CARE MGMT HIGH F2F 7D: CPT | Performed by: FAMILY MEDICINE

## 2017-10-25 ASSESSMENT — ENCOUNTER SYMPTOMS
WHEEZING: 0
SHORTNESS OF BREATH: 1
SORE THROAT: 0
DIFFICULTY BREATHING: 1
CONSTIPATION: 0
BACK PAIN: 0
SINUS PRESSURE: 0
VOMITING: 0
ABDOMINAL PAIN: 0
NAUSEA: 0
DIARRHEA: 0
COUGH: 0

## 2017-10-25 NOTE — PROGRESS NOTES
Subjective:      Patient ID: Hector Haley is a 79 y.o. female who presents today for:  Chief Complaint   Patient presents with    Results     patient is here to discuss the results of her recent CT scans. states she had a chest and abdomen CT at [facility] and results are in Chart.  Follow-Up from Hospital     patient is here for a follow up after being in [facility] for asthma. Pneumonia   She complains of difficulty breathing (intermittently) and shortness of breath. There is no cough or wheezing. This is a new problem. The current episode started 1 to 4 weeks ago. The problem has been gradually improving. Pertinent negatives include no chest pain, ear pain, fever, headaches, malaise/fatigue, postnasal drip or sore throat. Her symptoms are aggravated by animal exposure and pollen. Risk factors for lung disease include animal exposure. Her past medical history is significant for asthma. There is no history of bronchitis, COPD, emphysema or pneumonia. Past Medical History:   Diagnosis Date    Allergic rhinitis     Anemia     Asthma     Depression     Hyperlipidemia      Past Surgical History:   Procedure Laterality Date    COLONOSCOPY  09/17/2012    DILATION AND CURETTAGE OF UTERUS      TONSILLECTOMY AND ADENOIDECTOMY      TUBAL LIGATION       Family History   Problem Relation Age of Onset    Cancer Mother      breast    Emphysema Father      Social History     Social History    Marital status:      Spouse name: N/A    Number of children: N/A    Years of education: N/A     Occupational History    Not on file. Social History Main Topics    Smoking status: Never Smoker    Smokeless tobacco: Never Used    Alcohol use Yes    Drug use: No    Sexual activity: Not on file     Other Topics Concern    Not on file     Social History Narrative    No narrative on file     Allergies:  Aleve [naproxen sodium]; Salma-seltzer [aspirin effervescent];  Ibuprofen; Lanolin; Morphine; and

## 2017-11-21 ENCOUNTER — OFFICE VISIT (OUTPATIENT)
Dept: PRIMARY CARE CLINIC | Age: 67
End: 2017-11-21

## 2017-11-21 VITALS
SYSTOLIC BLOOD PRESSURE: 92 MMHG | TEMPERATURE: 98.3 F | RESPIRATION RATE: 14 BRPM | HEIGHT: 65 IN | HEART RATE: 80 BPM | DIASTOLIC BLOOD PRESSURE: 58 MMHG | WEIGHT: 223 LBS | BODY MASS INDEX: 37.15 KG/M2

## 2017-11-21 DIAGNOSIS — E55.9 VITAMIN D DEFICIENCY: ICD-10-CM

## 2017-11-21 DIAGNOSIS — R73.9 HYPERGLYCEMIA: ICD-10-CM

## 2017-11-21 DIAGNOSIS — F32.0 MILD SINGLE CURRENT EPISODE OF MAJOR DEPRESSIVE DISORDER (HCC): ICD-10-CM

## 2017-11-21 DIAGNOSIS — J45.21 MILD INTERMITTENT ASTHMA WITH ACUTE EXACERBATION: Primary | ICD-10-CM

## 2017-11-21 LAB — HBA1C MFR BLD: 6 %

## 2017-11-21 PROCEDURE — 99213 OFFICE O/P EST LOW 20 MIN: CPT | Performed by: FAMILY MEDICINE

## 2017-11-21 PROCEDURE — 83036 HEMOGLOBIN GLYCOSYLATED A1C: CPT | Performed by: FAMILY MEDICINE

## 2017-11-21 ASSESSMENT — ENCOUNTER SYMPTOMS
WHEEZING: 1
ABDOMINAL PAIN: 0
SHORTNESS OF BREATH: 1

## 2017-11-21 NOTE — PROGRESS NOTES
Subjective:      Patient ID: Rosalinda Rg is a 79 y.o. female who presents today for:  Chief Complaint   Patient presents with    Asthma     Pt. is here for a f/u on Asthma. Pt. was started on Xolair every two weeks. Pt. c/o not being able to breath when she has the attacks. Pt. states she had a stress test done and it was normal.     Anxiety     Pt. is here for a f/u on anxiety. Pt. was given Trintellix at her last visit and states it is working well for her. Pt. needs two weeks worth of samples and a prescription sent to the pharmacy. Asthma   She complains of shortness of breath and wheezing. Pertinent negatives include no chest pain or headaches. Her past medical history is significant for asthma. Patient was Rx Xolair every two weeks. Patient had a bad attack and was in the hospital for asthma at the beginning of November. Patient was Rx steroids. Anxiety  Patient presents today for Anxiety. Patient is currently taking Trintellix. Patient states the medication is working well for her. Past Medical History:   Diagnosis Date    Allergic rhinitis     Anemia     Asthma     Depression     Hyperlipidemia      Past Surgical History:   Procedure Laterality Date    COLONOSCOPY  09/17/2012    DILATION AND CURETTAGE OF UTERUS      TONSILLECTOMY AND ADENOIDECTOMY      TUBAL LIGATION       Family History   Problem Relation Age of Onset    Cancer Mother      breast    Emphysema Father      Social History     Social History    Marital status:      Spouse name: N/A    Number of children: N/A    Years of education: N/A     Occupational History    Not on file. Social History Main Topics    Smoking status: Never Smoker    Smokeless tobacco: Never Used    Alcohol use Yes    Drug use: No    Sexual activity: Not on file     Other Topics Concern    Not on file     Social History Narrative    No narrative on file     Allergies:  Aleve [naproxen sodium];  Shira Ramya [aspirin effervescent]; Ibuprofen; Lanolin; Morphine; and Viibryd [vilazodone hcl]    Review of Systems   Respiratory: Positive for shortness of breath and wheezing. Cardiovascular: Negative for chest pain, palpitations and leg swelling. Gastrointestinal: Negative for abdominal pain. Neurological: Negative for dizziness, light-headedness and headaches. Objective:   BP (!) 92/58 (Site: Left Arm, Position: Sitting, Cuff Size: Large Adult)   Pulse 80   Temp 98.3 °F (36.8 °C) (Oral)   Resp 14   Ht 5' 5\" (1.651 m)   Wt 223 lb (101.2 kg)   Breastfeeding? No   BMI 37.11 kg/m²     Physical Exam   Constitutional: She is oriented to person, place, and time. She appears well-developed and well-nourished. HENT:   Head: Normocephalic and atraumatic. Eyes: Conjunctivae and EOM are normal. Pupils are equal, round, and reactive to light. Neck: Normal range of motion. Neck supple. No thyromegaly present. Cardiovascular: Normal rate, regular rhythm and normal heart sounds. No murmur heard. Pulmonary/Chest: Effort normal. She has decreased breath sounds in the right middle field and the left middle field. She has no wheezes. She exhibits no tenderness. Abdominal: Soft. Bowel sounds are normal. There is no tenderness. Musculoskeletal: Normal range of motion. She exhibits no edema or tenderness. Lymphadenopathy:     She has no cervical adenopathy. Neurological: She is alert and oriented to person, place, and time. She has normal reflexes. Coordination normal.   Skin: Skin is warm and dry. No rash noted. Psychiatric: She has a normal mood and affect. Thought content normal.   Nursing note and vitals reviewed. Assessment:     1. Mild intermittent asthma with acute exacerbation  Omalizumab (XOLAIR SC)   2. Mild single current episode of major depressive disorder (HCC)  VORTIoxetine (TRINTELLIX) 10 MG TABS tablet   3. Hyperglycemia  POCT glycosylated hemoglobin (Hb A1C)   4.  Vitamin D

## 2017-11-29 DIAGNOSIS — J45.20 MILD INTERMITTENT ASTHMA WITHOUT COMPLICATION: ICD-10-CM

## 2017-11-29 RX ORDER — ALBUTEROL SULFATE 2.5 MG/3ML
2.5 SOLUTION RESPIRATORY (INHALATION) EVERY 6 HOURS PRN
Qty: 120 EACH | Refills: 3 | Status: SHIPPED | OUTPATIENT
Start: 2017-11-29 | End: 2018-10-02

## 2017-11-30 ENCOUNTER — TELEPHONE (OUTPATIENT)
Dept: PRIMARY CARE CLINIC | Age: 67
End: 2017-11-30

## 2017-12-07 DIAGNOSIS — J45.20 MILD INTERMITTENT ASTHMA WITHOUT COMPLICATION: ICD-10-CM

## 2017-12-07 RX ORDER — MONTELUKAST SODIUM 10 MG/1
TABLET ORAL
Qty: 90 TABLET | Refills: 1 | Status: SHIPPED | OUTPATIENT
Start: 2017-12-07 | End: 2018-05-21 | Stop reason: SDUPTHER

## 2018-02-04 DIAGNOSIS — J45.20 MILD INTERMITTENT ASTHMA WITHOUT COMPLICATION: ICD-10-CM

## 2018-02-04 RX ORDER — DILTIAZEM HYDROCHLORIDE 60 MG/1
2 TABLET, FILM COATED ORAL 2 TIMES DAILY
Qty: 10.2 INHALER | Refills: 3 | Status: SHIPPED | OUTPATIENT
Start: 2018-02-04 | End: 2018-05-07

## 2018-03-08 ENCOUNTER — OFFICE VISIT (OUTPATIENT)
Dept: PRIMARY CARE CLINIC | Age: 68
End: 2018-03-08
Payer: MEDICARE

## 2018-03-08 VITALS
TEMPERATURE: 95.8 F | SYSTOLIC BLOOD PRESSURE: 110 MMHG | HEIGHT: 65 IN | DIASTOLIC BLOOD PRESSURE: 64 MMHG | OXYGEN SATURATION: 98 % | WEIGHT: 224 LBS | BODY MASS INDEX: 37.32 KG/M2 | RESPIRATION RATE: 18 BRPM | HEART RATE: 86 BPM

## 2018-03-08 DIAGNOSIS — E78.2 MIXED HYPERLIPIDEMIA: ICD-10-CM

## 2018-03-08 DIAGNOSIS — Z01.818 PRE-OP EXAM: Primary | ICD-10-CM

## 2018-03-08 DIAGNOSIS — F33.41 MAJOR DEPRESSIVE DISORDER, RECURRENT, IN PARTIAL REMISSION (HCC): ICD-10-CM

## 2018-03-08 DIAGNOSIS — R21 BUTTERFLY RASH: ICD-10-CM

## 2018-03-08 DIAGNOSIS — T14.8XXA BRUISING: ICD-10-CM

## 2018-03-08 DIAGNOSIS — F32.0 MILD SINGLE CURRENT EPISODE OF MAJOR DEPRESSIVE DISORDER (HCC): ICD-10-CM

## 2018-03-08 DIAGNOSIS — M75.101 TEAR OF RIGHT ROTATOR CUFF, UNSPECIFIED TEAR EXTENT: ICD-10-CM

## 2018-03-08 DIAGNOSIS — J45.20 MILD INTERMITTENT ASTHMA WITHOUT COMPLICATION: ICD-10-CM

## 2018-03-08 DIAGNOSIS — Z01.818 PRE-OP EXAM: ICD-10-CM

## 2018-03-08 LAB
ALBUMIN SERPL-MCNC: 4.4 G/DL (ref 3.9–4.9)
ALP BLD-CCNC: 75 U/L (ref 40–130)
ALT SERPL-CCNC: 18 U/L (ref 0–33)
ANION GAP SERPL CALCULATED.3IONS-SCNC: 16 MEQ/L (ref 7–13)
APTT: 26.1 SEC (ref 21.6–35.4)
AST SERPL-CCNC: 16 U/L (ref 0–35)
BILIRUB SERPL-MCNC: 0.1 MG/DL (ref 0–1.2)
BILIRUBIN, POC: NORMAL
BLOOD URINE, POC: NORMAL
BUN BLDV-MCNC: 13 MG/DL (ref 8–23)
CALCIUM SERPL-MCNC: 9.1 MG/DL (ref 8.6–10.2)
CHLORIDE BLD-SCNC: 102 MEQ/L (ref 98–107)
CLARITY, POC: CLEAR
CO2: 24 MEQ/L (ref 22–29)
COLOR, POC: YELLOW
CREAT SERPL-MCNC: 0.63 MG/DL (ref 0.5–0.9)
GFR AFRICAN AMERICAN: >60
GFR NON-AFRICAN AMERICAN: >60
GLOBULIN: 2.2 G/DL (ref 2.3–3.5)
GLUCOSE BLD-MCNC: 63 MG/DL (ref 74–109)
GLUCOSE URINE, POC: NORMAL
HCT VFR BLD CALC: 37.5 % (ref 37–47)
HEMOGLOBIN: 11.9 G/DL (ref 12–16)
INR BLD: 1
KETONES, POC: NORMAL
LEUKOCYTE EST, POC: NORMAL
MCH RBC QN AUTO: 27.7 PG (ref 27–31.3)
MCHC RBC AUTO-ENTMCNC: 31.7 % (ref 33–37)
MCV RBC AUTO: 87.2 FL (ref 82–100)
NITRITE, POC: NORMAL
PDW BLD-RTO: 15.9 % (ref 11.5–14.5)
PH, POC: 5.5
PLATELET # BLD: 344 K/UL (ref 130–400)
POTASSIUM SERPL-SCNC: 4.6 MEQ/L (ref 3.5–5.1)
PROTEIN, POC: NORMAL
PROTHROMBIN TIME: 10.3 SEC (ref 8.1–13.7)
RBC # BLD: 4.3 M/UL (ref 4.2–5.4)
SODIUM BLD-SCNC: 142 MEQ/L (ref 132–144)
SPECIFIC GRAVITY, POC: 1.03
TOTAL PROTEIN: 6.6 G/DL (ref 6.4–8.1)
UROBILINOGEN, POC: NORMAL
WBC # BLD: 11.5 K/UL (ref 4.8–10.8)

## 2018-03-08 PROCEDURE — G8484 FLU IMMUNIZE NO ADMIN: HCPCS | Performed by: FAMILY MEDICINE

## 2018-03-08 PROCEDURE — 99214 OFFICE O/P EST MOD 30 MIN: CPT | Performed by: FAMILY MEDICINE

## 2018-03-08 PROCEDURE — 3014F SCREEN MAMMO DOC REV: CPT | Performed by: FAMILY MEDICINE

## 2018-03-08 PROCEDURE — 93000 ELECTROCARDIOGRAM COMPLETE: CPT | Performed by: FAMILY MEDICINE

## 2018-03-08 PROCEDURE — 1090F PRES/ABSN URINE INCON ASSESS: CPT | Performed by: FAMILY MEDICINE

## 2018-03-08 PROCEDURE — 4040F PNEUMOC VAC/ADMIN/RCVD: CPT | Performed by: FAMILY MEDICINE

## 2018-03-08 PROCEDURE — G8399 PT W/DXA RESULTS DOCUMENT: HCPCS | Performed by: FAMILY MEDICINE

## 2018-03-08 PROCEDURE — 1123F ACP DISCUSS/DSCN MKR DOCD: CPT | Performed by: FAMILY MEDICINE

## 2018-03-08 PROCEDURE — G8427 DOCREV CUR MEDS BY ELIG CLIN: HCPCS | Performed by: FAMILY MEDICINE

## 2018-03-08 PROCEDURE — 3017F COLORECTAL CA SCREEN DOC REV: CPT | Performed by: FAMILY MEDICINE

## 2018-03-08 PROCEDURE — 81002 URINALYSIS NONAUTO W/O SCOPE: CPT | Performed by: FAMILY MEDICINE

## 2018-03-08 PROCEDURE — 1036F TOBACCO NON-USER: CPT | Performed by: FAMILY MEDICINE

## 2018-03-08 PROCEDURE — G8417 CALC BMI ABV UP PARAM F/U: HCPCS | Performed by: FAMILY MEDICINE

## 2018-03-08 ASSESSMENT — ENCOUNTER SYMPTOMS
CHOKING: 0
COLOR CHANGE: 0
FACIAL SWELLING: 0
NAUSEA: 0
STRIDOR: 0
APNEA: 0
CHEST TIGHTNESS: 0
EYE DISCHARGE: 0
CONSTIPATION: 0
WHEEZING: 0
PHOTOPHOBIA: 0
EYE REDNESS: 0
ABDOMINAL PAIN: 0
DIARRHEA: 0
EYE PAIN: 0

## 2018-03-08 NOTE — PROGRESS NOTES
Lymphadenopathy:     She has no cervical adenopathy. Neurological: She is alert and oriented to person, place, and time. She has normal reflexes. Coordination normal.   Skin: Skin is warm and dry. No rash noted. Psychiatric: She has a normal mood and affect. Thought content normal.   Nursing note and vitals reviewed. Assessment:     1. Pre-op exam  POCT Urinalysis no Micro    CBC    Comprehensive Metabolic Panel    EKG 12 Lead    XR CHEST STANDARD (2 VW)   2. Tear of right rotator cuff, unspecified tear extent     3. Mixed hyperlipidemia     4. Mild intermittent asthma without complication  BREO ELLIPTA 200-25 MCG/INH AEPB   5. Bruising  Protime-INR    Aptt   6. Mild single current episode of major depressive disorder (HCC)     7. Butterfly rash  TIARA       Plan:      Orders Placed This Encounter   Procedures    XR CHEST STANDARD (2 VW)     Standing Status:   Future     Number of Occurrences:   1     Standing Expiration Date:   3/8/2019     Order Specific Question:   Reason for exam:     Answer:   pre op    CBC     Standing Status:   Future     Number of Occurrences:   1     Standing Expiration Date:   3/8/2019    Comprehensive Metabolic Panel     Standing Status:   Future     Number of Occurrences:   1     Standing Expiration Date:   3/8/2019    Protime-INR     Standing Status:   Future     Number of Occurrences:   1     Standing Expiration Date:   3/8/2019     Order Specific Question:   Daily Coumadin Dose? Answer:   n/a    Aptt     Standing Status:   Future     Number of Occurrences:   1     Standing Expiration Date:   3/8/2019     Order Specific Question:   Daily Heparin Dose?      Answer:   n/a    TIARA     Standing Status:   Future     Standing Expiration Date:   4/8/2018    POCT Urinalysis no Micro    EKG 12 Lead     Order Specific Question:   Reason for Exam?     Answer:   Pre-op         Controlled Substances Monitoring:      EKG indicates a normal sinus rhythm at 82 bpm without any acute

## 2018-03-09 LAB
ANA INTERPRETATION: NORMAL
ANTI-NUCLEAR ANTIBODY (ANA): NEGATIVE

## 2018-04-13 RX ORDER — AZITHROMYCIN 250 MG/1
TABLET, FILM COATED ORAL
Qty: 1 PACKET | Refills: 0 | Status: SHIPPED | OUTPATIENT
Start: 2018-04-13 | End: 2018-04-23

## 2018-05-07 ENCOUNTER — OFFICE VISIT (OUTPATIENT)
Dept: PRIMARY CARE CLINIC | Age: 68
End: 2018-05-07
Payer: MEDICARE

## 2018-05-07 VITALS
SYSTOLIC BLOOD PRESSURE: 116 MMHG | OXYGEN SATURATION: 96 % | RESPIRATION RATE: 14 BRPM | TEMPERATURE: 97.7 F | WEIGHT: 219 LBS | HEART RATE: 70 BPM | HEIGHT: 65 IN | DIASTOLIC BLOOD PRESSURE: 64 MMHG | BODY MASS INDEX: 36.49 KG/M2

## 2018-05-07 DIAGNOSIS — E03.9 HYPOTHYROIDISM (ACQUIRED): ICD-10-CM

## 2018-05-07 DIAGNOSIS — M54.5 ACUTE BILATERAL LOW BACK PAIN, WITH SCIATICA PRESENCE UNSPECIFIED: Primary | ICD-10-CM

## 2018-05-07 DIAGNOSIS — N28.1 RENAL CYST, ACQUIRED: ICD-10-CM

## 2018-05-07 DIAGNOSIS — J45.20 MILD INTERMITTENT ASTHMA WITHOUT COMPLICATION: ICD-10-CM

## 2018-05-07 DIAGNOSIS — Z23 NEED FOR PNEUMOCOCCAL VACCINATION: ICD-10-CM

## 2018-05-07 DIAGNOSIS — E55.9 VITAMIN D DEFICIENCY: ICD-10-CM

## 2018-05-07 LAB
TSH SERPL DL<=0.05 MIU/L-ACNC: 2.47 UIU/ML (ref 0.27–4.2)
VITAMIN D 25-HYDROXY: 35.2 NG/ML (ref 30–100)

## 2018-05-07 PROCEDURE — 1090F PRES/ABSN URINE INCON ASSESS: CPT | Performed by: FAMILY MEDICINE

## 2018-05-07 PROCEDURE — 4040F PNEUMOC VAC/ADMIN/RCVD: CPT | Performed by: FAMILY MEDICINE

## 2018-05-07 PROCEDURE — 1123F ACP DISCUSS/DSCN MKR DOCD: CPT | Performed by: FAMILY MEDICINE

## 2018-05-07 PROCEDURE — 90732 PPSV23 VACC 2 YRS+ SUBQ/IM: CPT | Performed by: FAMILY MEDICINE

## 2018-05-07 PROCEDURE — G8427 DOCREV CUR MEDS BY ELIG CLIN: HCPCS | Performed by: FAMILY MEDICINE

## 2018-05-07 PROCEDURE — 3017F COLORECTAL CA SCREEN DOC REV: CPT | Performed by: FAMILY MEDICINE

## 2018-05-07 PROCEDURE — G8417 CALC BMI ABV UP PARAM F/U: HCPCS | Performed by: FAMILY MEDICINE

## 2018-05-07 PROCEDURE — G0009 ADMIN PNEUMOCOCCAL VACCINE: HCPCS | Performed by: FAMILY MEDICINE

## 2018-05-07 PROCEDURE — 1036F TOBACCO NON-USER: CPT | Performed by: FAMILY MEDICINE

## 2018-05-07 PROCEDURE — G8399 PT W/DXA RESULTS DOCUMENT: HCPCS | Performed by: FAMILY MEDICINE

## 2018-05-07 PROCEDURE — 99214 OFFICE O/P EST MOD 30 MIN: CPT | Performed by: FAMILY MEDICINE

## 2018-05-07 RX ORDER — HYDROCODONE BITARTRATE AND ACETAMINOPHEN 5; 325 MG/1; MG/1
TABLET ORAL
Refills: 0 | COMMUNITY
Start: 2018-03-28 | End: 2018-07-03 | Stop reason: ALTCHOICE

## 2018-05-07 RX ORDER — ACETAMINOPHEN AND CODEINE PHOSPHATE 300; 30 MG/1; MG/1
TABLET ORAL
Refills: 0 | COMMUNITY
Start: 2018-03-26 | End: 2018-07-03 | Stop reason: ALTCHOICE

## 2018-05-07 ASSESSMENT — ENCOUNTER SYMPTOMS
GASTROINTESTINAL NEGATIVE: 1
EYES NEGATIVE: 1
DIARRHEA: 0
WHEEZING: 0
RESPIRATORY NEGATIVE: 1
CONSTIPATION: 0
EYE REDNESS: 0
EYE ITCHING: 0
BACK PAIN: 1
BOWEL INCONTINENCE: 0
SHORTNESS OF BREATH: 0
PHOTOPHOBIA: 0
ABDOMINAL PAIN: 0

## 2018-05-10 ENCOUNTER — TELEPHONE (OUTPATIENT)
Dept: PRIMARY CARE CLINIC | Age: 68
End: 2018-05-10

## 2018-05-21 DIAGNOSIS — J45.20 MILD INTERMITTENT ASTHMA WITHOUT COMPLICATION: ICD-10-CM

## 2018-05-21 RX ORDER — MONTELUKAST SODIUM 10 MG/1
TABLET ORAL
Qty: 90 TABLET | Refills: 1 | Status: SHIPPED | OUTPATIENT
Start: 2018-05-21

## 2018-06-05 ENCOUNTER — HOSPITAL ENCOUNTER (OUTPATIENT)
Dept: CT IMAGING | Age: 68
Discharge: HOME | End: 2018-06-07
Payer: MEDICARE

## 2018-06-05 VITALS — HEART RATE: 80 BPM | DIASTOLIC BLOOD PRESSURE: 74 MMHG | SYSTOLIC BLOOD PRESSURE: 115 MMHG

## 2018-06-05 DIAGNOSIS — N28.1 RENAL CYST, ACQUIRED: ICD-10-CM

## 2018-06-05 PROCEDURE — 74178 CT ABD&PLV WO CNTR FLWD CNTR: CPT

## 2018-06-05 PROCEDURE — 6360000004 HC RX CONTRAST MEDICATION: Performed by: FAMILY MEDICINE

## 2018-06-05 RX ADMIN — IOVERSOL 100 ML: 678 INJECTION INTRA-ARTERIAL; INTRAVENOUS at 10:04

## 2018-06-13 ENCOUNTER — TELEPHONE (OUTPATIENT)
Dept: PRIMARY CARE CLINIC | Age: 68
End: 2018-06-13

## 2018-06-13 DIAGNOSIS — N28.1 RENAL CYST, ACQUIRED, LEFT: Primary | ICD-10-CM

## 2018-06-20 DIAGNOSIS — N28.9 RENAL LESION: Primary | ICD-10-CM

## 2018-06-21 ENCOUNTER — TELEPHONE (OUTPATIENT)
Dept: UROLOGY | Age: 68
End: 2018-06-21

## 2018-06-21 ENCOUNTER — TELEPHONE (OUTPATIENT)
Dept: PRIMARY CARE CLINIC | Age: 68
End: 2018-06-21

## 2018-06-21 DIAGNOSIS — R93.5 ABNORMAL ABDOMINAL CT SCAN: ICD-10-CM

## 2018-06-21 DIAGNOSIS — M51.36 DDD (DEGENERATIVE DISC DISEASE), LUMBAR: Primary | ICD-10-CM

## 2018-07-03 ENCOUNTER — OFFICE VISIT (OUTPATIENT)
Dept: UROLOGY | Age: 68
End: 2018-07-03
Payer: MEDICARE

## 2018-07-03 VITALS
SYSTOLIC BLOOD PRESSURE: 122 MMHG | BODY MASS INDEX: 35.82 KG/M2 | DIASTOLIC BLOOD PRESSURE: 82 MMHG | HEART RATE: 81 BPM | WEIGHT: 215 LBS | HEIGHT: 65 IN

## 2018-07-03 DIAGNOSIS — N28.9 RENAL LESION: Primary | ICD-10-CM

## 2018-07-03 DIAGNOSIS — N28.89 RENAL MASS, LEFT: ICD-10-CM

## 2018-07-03 LAB
BILIRUBIN URINE: NEGATIVE
BILIRUBIN, POC: NORMAL
BLOOD URINE, POC: NORMAL
BLOOD, URINE: NEGATIVE
CLARITY, POC: CLEAR
CLARITY: ABNORMAL
COLOR, POC: YELLOW
COLOR: YELLOW
GLUCOSE URINE, POC: NORMAL
GLUCOSE URINE: NEGATIVE MG/DL
KETONES, POC: NORMAL
KETONES, URINE: NEGATIVE MG/DL
LEUKOCYTE EST, POC: NORMAL
LEUKOCYTE ESTERASE, URINE: NEGATIVE
NITRITE, POC: NORMAL
NITRITE, URINE: NEGATIVE
PH UA: 5.5 (ref 5–9)
PH, POC: 5
PROTEIN UA: NEGATIVE MG/DL
PROTEIN, POC: NORMAL
SPECIFIC GRAVITY UA: 1.02 (ref 1–1.03)
SPECIFIC GRAVITY, POC: >1.03
UROBILINOGEN, POC: 0.2
UROBILINOGEN, URINE: 0.2 E.U./DL

## 2018-07-03 PROCEDURE — 3017F COLORECTAL CA SCREEN DOC REV: CPT | Performed by: UROLOGY

## 2018-07-03 PROCEDURE — G8417 CALC BMI ABV UP PARAM F/U: HCPCS | Performed by: UROLOGY

## 2018-07-03 PROCEDURE — 99203 OFFICE O/P NEW LOW 30 MIN: CPT | Performed by: UROLOGY

## 2018-07-03 PROCEDURE — 1036F TOBACCO NON-USER: CPT | Performed by: UROLOGY

## 2018-07-03 PROCEDURE — G8399 PT W/DXA RESULTS DOCUMENT: HCPCS | Performed by: UROLOGY

## 2018-07-03 PROCEDURE — 1123F ACP DISCUSS/DSCN MKR DOCD: CPT | Performed by: UROLOGY

## 2018-07-03 PROCEDURE — 99999 URINALYSIS WITH MICROSCOPIC: CPT | Performed by: UROLOGY

## 2018-07-03 PROCEDURE — 1090F PRES/ABSN URINE INCON ASSESS: CPT | Performed by: UROLOGY

## 2018-07-03 PROCEDURE — 4040F PNEUMOC VAC/ADMIN/RCVD: CPT | Performed by: UROLOGY

## 2018-07-03 PROCEDURE — G8427 DOCREV CUR MEDS BY ELIG CLIN: HCPCS | Performed by: UROLOGY

## 2018-07-03 PROCEDURE — 81003 URINALYSIS AUTO W/O SCOPE: CPT | Performed by: UROLOGY

## 2018-07-03 ASSESSMENT — ENCOUNTER SYMPTOMS
ABDOMINAL PAIN: 0
DIARRHEA: 0
ABDOMINAL DISTENTION: 0
NAUSEA: 0
VOMITING: 0
SHORTNESS OF BREATH: 1

## 2018-07-03 NOTE — PROGRESS NOTES
Subjective:      Patient ID: Bernadette Cooley is a 79 y.o. female. HPI This is a 80 yo female with COPD, Anemia, Depression, and sent for evaluation by Dr Argenis Lin for a Lt renal mass. The patient reports that about 2 years ago in 2016, she had mild Rt upper abdominal pain and had a Ct done at that time that showed a small Lt renal mass that was to be followed. She had a F/U Ct recently that showed some interval growth in the mass and was sent for evaluation. She has no abd or flank pain. She has no hematuria or dysuria. She has no F/c or N/V. She gets occasional urgency and mild UI with JACKELYN that is mild. These symptoms are not that bothersome. She has NF 2-3 and Df < 8. She has a good flow and feels the bladder empties. She has no prior  surgical history. She does not smoke ciggs. She has no family h/o  malignancies. I reviewed the CT on PACS personally. She is here with her  today.      Past Medical History:   Diagnosis Date    Allergic rhinitis     Anemia     Asthma     Depression     Hyperlipidemia      Past Surgical History:   Procedure Laterality Date    COLONOSCOPY  09/17/2012    COLONOSCOPY  01/25/2018    DR SHERRIE Morrison DILATION AND CURETTAGE OF UTERUS      ROTATOR CUFF REPAIR Right 03/27/2018    TONSILLECTOMY AND ADENOIDECTOMY      TUBAL LIGATION       Social History     Social History    Marital status:      Spouse name: N/A    Number of children: N/A    Years of education: N/A     Social History Main Topics    Smoking status: Never Smoker    Smokeless tobacco: Never Used    Alcohol use Yes    Drug use: No    Sexual activity: Not Asked     Other Topics Concern    None     Social History Narrative    None     Family History   Problem Relation Age of Onset    Cancer Mother         breast    Emphysema Father      Current Outpatient Prescriptions   Medication Sig Dispense Refill    montelukast (SINGULAIR) 10 MG tablet TAKE 1 TABLET DAILY 90 tablet 1    albuterol UROPATHY.       NO BOWEL DILATATION. NORMAL APPENDIX. NO DIVERTICULITIS.       3.6 X 2.7 CM HYPODENSE LESION LEFT OVARY, SUSPECTED OVARIAN CYST.       GRADE 1 ANTEROLISTHESIS L4-5 WITH NARROWING OF LUMBAR DISC AND HYPERTROPHY OF FACET JOINTS.                 All CT scans at this facility use dose modulation, iterative reconstruction, and/or weight based dosing when appropriate to reduce radiation dose to as low as reasonably achievable.           Assessment: This is a 80 yo female with COPD, Anemia, Depression, and with a 1.7 cm exophytic, Lt renal lesion which is indeterminate by recent CT and may have increased in size slightly from prior CT in 2016. She has no symptoms. I recommend an MRI for further evaluation and if is worrisome by MRI options would include biopsy and ablation vs close follow-up with imaging vs Robotic partial nephrectomy. All questions were answered to the patient and 's apparent satisfaction. Plan:      1. U/A with micro  2.  MRI abdomen with and without Michael and F/U 1-2 weeks

## 2018-07-18 ENCOUNTER — HOSPITAL ENCOUNTER (OUTPATIENT)
Dept: MRI IMAGING | Age: 68
Discharge: HOME | End: 2018-07-20
Payer: MEDICARE

## 2018-07-18 DIAGNOSIS — N28.9 RENAL LESION: ICD-10-CM

## 2018-07-18 PROCEDURE — A9577 INJ MULTIHANCE: HCPCS | Performed by: RADIOLOGY

## 2018-07-18 PROCEDURE — 74183 MRI ABD W/O CNTR FLWD CNTR: CPT

## 2018-07-18 PROCEDURE — 6360000004 HC RX CONTRAST MEDICATION: Performed by: RADIOLOGY

## 2018-07-18 RX ORDER — SODIUM CHLORIDE 0.9 % (FLUSH) 0.9 %
10 SYRINGE (ML) INJECTION 2 TIMES DAILY
Status: DISCONTINUED | OUTPATIENT
Start: 2018-07-18 | End: 2018-07-21 | Stop reason: HOSPADM

## 2018-07-18 RX ADMIN — GADOBENATE DIMEGLUMINE 20 ML: 529 INJECTION, SOLUTION INTRAVENOUS at 11:08

## 2018-07-25 ENCOUNTER — OFFICE VISIT (OUTPATIENT)
Dept: UROLOGY | Age: 68
End: 2018-07-25
Payer: MEDICARE

## 2018-07-25 VITALS
HEART RATE: 80 BPM | BODY MASS INDEX: 35.82 KG/M2 | HEIGHT: 65 IN | SYSTOLIC BLOOD PRESSURE: 136 MMHG | WEIGHT: 215 LBS | DIASTOLIC BLOOD PRESSURE: 82 MMHG

## 2018-07-25 DIAGNOSIS — N28.1 RENAL CYST, LEFT: Primary | ICD-10-CM

## 2018-07-25 PROCEDURE — 1036F TOBACCO NON-USER: CPT | Performed by: UROLOGY

## 2018-07-25 PROCEDURE — 1101F PT FALLS ASSESS-DOCD LE1/YR: CPT | Performed by: UROLOGY

## 2018-07-25 PROCEDURE — 1090F PRES/ABSN URINE INCON ASSESS: CPT | Performed by: UROLOGY

## 2018-07-25 PROCEDURE — 3017F COLORECTAL CA SCREEN DOC REV: CPT | Performed by: UROLOGY

## 2018-07-25 PROCEDURE — G8417 CALC BMI ABV UP PARAM F/U: HCPCS | Performed by: UROLOGY

## 2018-07-25 PROCEDURE — 1123F ACP DISCUSS/DSCN MKR DOCD: CPT | Performed by: UROLOGY

## 2018-07-25 PROCEDURE — G8399 PT W/DXA RESULTS DOCUMENT: HCPCS | Performed by: UROLOGY

## 2018-07-25 PROCEDURE — G8427 DOCREV CUR MEDS BY ELIG CLIN: HCPCS | Performed by: UROLOGY

## 2018-07-25 PROCEDURE — 4040F PNEUMOC VAC/ADMIN/RCVD: CPT | Performed by: UROLOGY

## 2018-07-25 PROCEDURE — 99213 OFFICE O/P EST LOW 20 MIN: CPT | Performed by: UROLOGY

## 2018-07-25 RX ORDER — FLUTICASONE FUROATE AND VILANTEROL TRIFENATATE 100; 25 UG/1; UG/1
POWDER RESPIRATORY (INHALATION)
COMMUNITY
Start: 2018-07-16 | End: 2019-07-31 | Stop reason: ALTCHOICE

## 2018-07-25 ASSESSMENT — ENCOUNTER SYMPTOMS
ABDOMINAL DISTENTION: 0
ABDOMINAL PAIN: 0

## 2018-07-25 NOTE — PROGRESS NOTES
Subjective:      Patient ID: Adriano Orona is a 79 y.o. female. HPI  This is a 80 yo female with COPD, Anemia, Depression, and back for evaluation of a Lt renal mass. Since last seen on 7/3/18, she has no abd or flank pain. She has no hematuria or dysuria. I reviewed the interval MRI report with the patient and her  in detail. She has no other complaints.     Past Medical History:   Diagnosis Date    Allergic rhinitis     Anemia     Asthma     Depression     Hyperlipidemia      Past Surgical History:   Procedure Laterality Date    COLONOSCOPY  09/17/2012    COLONOSCOPY  01/25/2018    DR SHERRIE [facility] DILATION AND CURETTAGE OF UTERUS      ROTATOR CUFF REPAIR Right 03/27/2018    TONSILLECTOMY AND ADENOIDECTOMY      TUBAL LIGATION       Social History     Social History    Marital status:      Spouse name: N/A    Number of children: N/A    Years of education: N/A     Social History Main Topics    Smoking status: Never Smoker    Smokeless tobacco: Never Used    Alcohol use Yes    Drug use: No    Sexual activity: Not Asked     Other Topics Concern    None     Social History Narrative    None     Family History   Problem Relation Age of Onset    Cancer Mother         breast    Emphysema Father      Current Outpatient Prescriptions   Medication Sig Dispense Refill    BREO ELLIPTA 100-25 MCG/INH AEPB inhaler       montelukast (SINGULAIR) 10 MG tablet TAKE 1 TABLET DAILY 90 tablet 1    albuterol sulfate (PROAIR RESPICLICK) 713 (90 Base) MCG/ACT aerosol powder inhalation Inhale 2 puffs into the lungs every 4 hours as needed for Wheezing or Shortness of Breath 3 Inhaler 1    albuterol (PROVENTIL) (2.5 MG/3ML) 0.083% nebulizer solution Take 3 mLs by nebulization every 6 hours as needed for Wheezing 120 each 3    Cholecalciferol (VITAMIN D3) 1000 units CAPS Take 1 capsule by mouth daily      Omalizumab (XOLAIR SC) Inject into the skin      ipratropium (ATROVENT) 0.02 % nebulizer 07/03/2018  6:56 PM [address] [facility]   Nitrite, Urine Negative  Negative Final 07/03/2018  6:56 PM [address] [facility]   Leukocyte Esterase, Urine Negative  Negative Final 07/03/2018  6:56 PM [address] [facility]   Testing Performed By         Priority Sent On From To Message Type    7/19/2018 10:11 AM Danny, Chpo Incoming Radiant Results From Dilan Nava MD Results   Radiation Dose Estimates     No radiation information found for this patient   Narrative   EXAMINATION: MRI ABDOMEN W/WO CONTRAST:       DATE AND TIME: 7/18/2018 at 9:56 AM.       CLINICAL HISTORY: RENAL LESION.        COMPARISON: None.       TECHNIQUE: Multiplanar, multi-pulse sequence MRI of the abdomen without intravenous contrast).  Pulse sequences included but not limited to: axial in- and out of phase, coronal 2-D FIESTA, coronal fat-saturation T2, axial T2 without and with    fat-saturation, axial fat-suppressed T1. 20 cc of MultiHance contrast administered intravenously.       FINDINGS:       Liver:  The liver has normal contour, morphology and signal intensity, with no focal mass on these noncontrast images.                                        Gallbladder:  Gallstone noted.       Pancreas: The pancreas has normal signal intensity with no mass or ductal dilatation.       Spleen: Normal.       Adrenals: Normal.       Kidneys: A 2 cm exophytic lesion projecting off the posterior mid pole of the left kidney shows fluid signal intensity with a hyperintense T1 consistent with some intracystic hemorrhagic or proteinaceous components. There is no evidence of internal    enhancement. Findings are all consistent with a minimally complex Bosniak type II cyst. There is an 8 mm simple cyst in the upper pole of the right kidney.       Bowel: The included small bowel and colonic loops are all normal in caliber and wall thickness.  No evidence of small bowel obstruction.       Other findings:  There is no abdominal

## 2018-10-02 ENCOUNTER — OFFICE VISIT (OUTPATIENT)
Dept: PRIMARY CARE CLINIC | Age: 68
End: 2018-10-02
Payer: MEDICARE

## 2018-10-02 VITALS
OXYGEN SATURATION: 97 % | TEMPERATURE: 97.8 F | BODY MASS INDEX: 36.49 KG/M2 | DIASTOLIC BLOOD PRESSURE: 70 MMHG | HEIGHT: 65 IN | WEIGHT: 219 LBS | SYSTOLIC BLOOD PRESSURE: 115 MMHG | HEART RATE: 72 BPM

## 2018-10-02 DIAGNOSIS — J45.20 MILD INTERMITTENT ASTHMA WITHOUT COMPLICATION: Primary | ICD-10-CM

## 2018-10-02 DIAGNOSIS — Z23 NEED FOR TDAP VACCINATION: ICD-10-CM

## 2018-10-02 PROCEDURE — 90471 IMMUNIZATION ADMIN: CPT | Performed by: NURSE PRACTITIONER

## 2018-10-02 PROCEDURE — 90715 TDAP VACCINE 7 YRS/> IM: CPT | Performed by: NURSE PRACTITIONER

## 2018-10-02 PROCEDURE — 1101F PT FALLS ASSESS-DOCD LE1/YR: CPT | Performed by: NURSE PRACTITIONER

## 2018-10-02 PROCEDURE — 99213 OFFICE O/P EST LOW 20 MIN: CPT | Performed by: NURSE PRACTITIONER

## 2018-10-02 PROCEDURE — 4040F PNEUMOC VAC/ADMIN/RCVD: CPT | Performed by: NURSE PRACTITIONER

## 2018-10-02 PROCEDURE — 1090F PRES/ABSN URINE INCON ASSESS: CPT | Performed by: NURSE PRACTITIONER

## 2018-10-02 PROCEDURE — 1123F ACP DISCUSS/DSCN MKR DOCD: CPT | Performed by: NURSE PRACTITIONER

## 2018-10-02 PROCEDURE — G8399 PT W/DXA RESULTS DOCUMENT: HCPCS | Performed by: NURSE PRACTITIONER

## 2018-10-02 PROCEDURE — G8427 DOCREV CUR MEDS BY ELIG CLIN: HCPCS | Performed by: NURSE PRACTITIONER

## 2018-10-02 PROCEDURE — 1036F TOBACCO NON-USER: CPT | Performed by: NURSE PRACTITIONER

## 2018-10-02 PROCEDURE — 3017F COLORECTAL CA SCREEN DOC REV: CPT | Performed by: NURSE PRACTITIONER

## 2018-10-02 PROCEDURE — G8484 FLU IMMUNIZE NO ADMIN: HCPCS | Performed by: NURSE PRACTITIONER

## 2018-10-02 PROCEDURE — G8417 CALC BMI ABV UP PARAM F/U: HCPCS | Performed by: NURSE PRACTITIONER

## 2018-10-02 ASSESSMENT — ENCOUNTER SYMPTOMS
RHINORRHEA: 0
TROUBLE SWALLOWING: 0
HEMOPTYSIS: 0
CHEST TIGHTNESS: 0
SHORTNESS OF BREATH: 0
COUGH: 0
SPUTUM PRODUCTION: 0
DIFFICULTY BREATHING: 0
FREQUENT THROAT CLEARING: 0
WHEEZING: 0

## 2018-10-02 ASSESSMENT — PATIENT HEALTH QUESTIONNAIRE - PHQ9
SUM OF ALL RESPONSES TO PHQ9 QUESTIONS 1 & 2: 0
1. LITTLE INTEREST OR PLEASURE IN DOING THINGS: 0
2. FEELING DOWN, DEPRESSED OR HOPELESS: 0
SUM OF ALL RESPONSES TO PHQ QUESTIONS 1-9: 0
SUM OF ALL RESPONSES TO PHQ QUESTIONS 1-9: 0

## 2018-10-02 NOTE — PROGRESS NOTES
education: N/A     Occupational History    Not on file. Social History Main Topics    Smoking status: Never Smoker    Smokeless tobacco: Never Used    Alcohol use Yes    Drug use: No    Sexual activity: Not on file     Other Topics Concern    Not on file     Social History Narrative    No narrative on file     Current Outpatient Prescriptions on File Prior to Visit   Medication Sig Dispense Refill    BREO ELLIPTA 100-25 MCG/INH AEPB inhaler       montelukast (SINGULAIR) 10 MG tablet TAKE 1 TABLET DAILY 90 tablet 1    Cholecalciferol (VITAMIN D3) 1000 units CAPS Take 1 capsule by mouth daily      Omalizumab (XOLAIR SC) Inject into the skin      fluticasone (FLONASE) 50 MCG/ACT nasal spray 1 spray by Nasal route daily 1 Bottle 3     Current Facility-Administered Medications on File Prior to Visit   Medication Dose Route Frequency Provider Last Rate Last Dose    triamcinolone acetonide (KENALOG-40) injection 80 mg  80 mg Intramuscular Once Ivonne Garcia DO           Allergies:  Aleve [naproxen sodium]; Salma-seltzer [aspirin effervescent]; Ibuprofen; Lanolin; Morphine; and Viibryd [vilazodone hcl]    Review of Systems   Constitutional: Negative for fever. HENT: Negative for ear pain, postnasal drip, rhinorrhea, sneezing and trouble swallowing. Respiratory: Negative for cough, hemoptysis, sputum production, shortness of breath and wheezing. Cardiovascular: Negative for chest pain. Neurological: Negative for headaches. Objective:   /70 (Site: Left Upper Arm, Position: Sitting, Cuff Size: Medium Adult)   Pulse 72   Temp 97.8 °F (36.6 °C) (Oral)   Ht 5' 5\" (1.651 m)   Wt 219 lb (99.3 kg)   SpO2 97%   BMI 36.44 kg/m²     Physical Exam   Constitutional: She is oriented to person, place, and time. She appears well-developed and well-nourished. HENT:   Head: Normocephalic.    Right Ear: Tympanic membrane, external ear and ear canal normal.   Left Ear: Tympanic membrane, external ear and ear canal normal.   Nose: Nose normal.   Mouth/Throat: Uvula is midline, oropharynx is clear and moist and mucous membranes are normal.   Eyes: Conjunctivae are normal. Right eye exhibits no discharge. Left eye exhibits no discharge. Neck: Normal range of motion. Cardiovascular: Normal rate, regular rhythm and normal heart sounds. Pulmonary/Chest: Effort normal and breath sounds normal. No respiratory distress. Lymphadenopathy:     She has no cervical adenopathy. Neurological: She is alert and oriented to person, place, and time. Skin: Skin is warm and dry. Psychiatric: She has a normal mood and affect. Her behavior is normal.       Assessment:       Diagnosis Orders   1. Mild intermittent asthma without complication     2. Need for Tdap vaccination  Tdap (age 10y-63y) IM (ADACEL)       Plan:      Orders Placed This Encounter   Procedures    Tdap (age 10y-63y) IM (ADACEL)       No orders of the defined types were placed in this encounter. Return if symptoms worsen or fail to improve. Reviewed with the patient: current clinical status, medications, activities and diet. Side effects, adverse effects of the medication prescribed today, as well as treatment plan/ rationale and result expectations have been discussed with the patient who expresses understanding and desires to proceed. Close follow up to evaluate treatment results and for coordination of care. I have reviewed the patient's medical history in detail and updated the computerized patient record.     WILLOW Grimes - CNP

## 2018-10-26 ENCOUNTER — NURSE ONLY (OUTPATIENT)
Dept: PRIMARY CARE CLINIC | Age: 68
End: 2018-10-26
Payer: MEDICARE

## 2018-10-26 DIAGNOSIS — Z23 NEED FOR INFLUENZA VACCINATION: Primary | ICD-10-CM

## 2018-10-26 PROCEDURE — 90662 IIV NO PRSV INCREASED AG IM: CPT | Performed by: PHYSICIAN ASSISTANT

## 2018-10-26 PROCEDURE — G0008 ADMIN INFLUENZA VIRUS VAC: HCPCS | Performed by: PHYSICIAN ASSISTANT

## 2018-11-27 ENCOUNTER — CARE COORDINATION (OUTPATIENT)
Dept: CASE MANAGEMENT | Age: 68
End: 2018-11-27

## 2018-12-05 ENCOUNTER — CARE COORDINATION (OUTPATIENT)
Dept: CARE COORDINATION | Age: 68
End: 2018-12-05

## 2019-02-18 ENCOUNTER — TELEPHONE (OUTPATIENT)
Dept: FAMILY MEDICINE CLINIC | Age: 69
End: 2019-02-18

## 2019-06-04 ENCOUNTER — TELEPHONE (OUTPATIENT)
Dept: FAMILY MEDICINE CLINIC | Age: 69
End: 2019-06-04

## 2019-06-04 NOTE — TELEPHONE ENCOUNTER
Left message for patient to call office back regarding scheduling an appointment with Derick Larsen or staying with Dr. Leopold Hanks.

## 2019-07-17 ENCOUNTER — HOSPITAL ENCOUNTER (OUTPATIENT)
Dept: ULTRASOUND IMAGING | Age: 69
Discharge: HOME | End: 2019-07-19
Payer: MEDICARE

## 2019-07-17 DIAGNOSIS — N28.1 RENAL CYST, LEFT: ICD-10-CM

## 2019-07-17 PROCEDURE — 76775 US EXAM ABDO BACK WALL LIM: CPT

## 2019-07-31 ENCOUNTER — OFFICE VISIT (OUTPATIENT)
Dept: UROLOGY | Age: 69
End: 2019-07-31
Payer: MEDICARE

## 2019-07-31 VITALS
SYSTOLIC BLOOD PRESSURE: 124 MMHG | BODY MASS INDEX: 34.82 KG/M2 | DIASTOLIC BLOOD PRESSURE: 80 MMHG | WEIGHT: 209 LBS | HEART RATE: 82 BPM | HEIGHT: 65 IN

## 2019-07-31 DIAGNOSIS — N28.1 RENAL CYST, LEFT: Primary | ICD-10-CM

## 2019-07-31 PROCEDURE — 1123F ACP DISCUSS/DSCN MKR DOCD: CPT | Performed by: UROLOGY

## 2019-07-31 PROCEDURE — 1036F TOBACCO NON-USER: CPT | Performed by: UROLOGY

## 2019-07-31 PROCEDURE — 3017F COLORECTAL CA SCREEN DOC REV: CPT | Performed by: UROLOGY

## 2019-07-31 PROCEDURE — 1090F PRES/ABSN URINE INCON ASSESS: CPT | Performed by: UROLOGY

## 2019-07-31 PROCEDURE — 4040F PNEUMOC VAC/ADMIN/RCVD: CPT | Performed by: UROLOGY

## 2019-07-31 PROCEDURE — 99213 OFFICE O/P EST LOW 20 MIN: CPT | Performed by: UROLOGY

## 2019-07-31 PROCEDURE — G8399 PT W/DXA RESULTS DOCUMENT: HCPCS | Performed by: UROLOGY

## 2019-07-31 PROCEDURE — G8427 DOCREV CUR MEDS BY ELIG CLIN: HCPCS | Performed by: UROLOGY

## 2019-07-31 PROCEDURE — G8417 CALC BMI ABV UP PARAM F/U: HCPCS | Performed by: UROLOGY

## 2019-07-31 ASSESSMENT — ENCOUNTER SYMPTOMS
ABDOMINAL PAIN: 0
ABDOMINAL DISTENTION: 0
SHORTNESS OF BREATH: 0

## 2019-07-31 NOTE — PROGRESS NOTES
Subjective:      Patient ID: Yon Springer is a 76 y.o. female. HPI  This is a 75 yo female with COPD, Anemia, Depression, and back for follow-up with prior h/o renal cysts. Since last seen on 7/25/18, she has no interval abdominal or flank pain. She has no hematuria or dysuria. I reviewed the interval Renal U/S results in detail with the patient today. She has no new medical or surgical problems. Past Medical History:   Diagnosis Date    Allergic rhinitis     Anemia     Asthma     Depression     Hyperlipidemia      Past Surgical History:   Procedure Laterality Date    BREAST SURGERY Left     cancer    COLONOSCOPY  09/17/2012    COLONOSCOPY  01/25/2018    DR SHERRIE Onofre DILATION AND CURETTAGE OF UTERUS      ROTATOR CUFF REPAIR Right 03/27/2018    TONSILLECTOMY AND ADENOIDECTOMY      TUBAL LIGATION       Social History     Socioeconomic History    Marital status:      Spouse name: None    Number of children: None    Years of education: None    Highest education level: None   Occupational History    None   Social Needs    Financial resource strain: None    Food insecurity:     Worry: None     Inability: None    Transportation needs:     Medical: None     Non-medical: None   Tobacco Use    Smoking status: Never Smoker    Smokeless tobacco: Never Used   Substance and Sexual Activity    Alcohol use:  Yes    Drug use: No    Sexual activity: Not Currently     Partners: Male   Lifestyle    Physical activity:     Days per week: None     Minutes per session: None    Stress: None   Relationships    Social connections:     Talks on phone: None     Gets together: None     Attends Islam service: None     Active member of club or organization: None     Attends meetings of clubs or organizations: None     Relationship status: None    Intimate partner violence:     Fear of current or ex partner: None     Emotionally abused: None     Physically abused: None     Forced sexual activity:

## 2020-07-29 ENCOUNTER — HOSPITAL ENCOUNTER (OUTPATIENT)
Dept: ULTRASOUND IMAGING | Age: 70
Discharge: HOME | End: 2020-07-31
Payer: MEDICARE

## 2020-07-29 PROCEDURE — 76775 US EXAM ABDO BACK WALL LIM: CPT

## 2020-07-30 ENCOUNTER — VIRTUAL VISIT (OUTPATIENT)
Dept: UROLOGY | Age: 70
End: 2020-07-30
Payer: MEDICARE

## 2020-07-30 PROCEDURE — 99441 PR PHYS/QHP TELEPHONE EVALUATION 5-10 MIN: CPT | Performed by: UROLOGY

## 2020-07-30 RX ORDER — ANASTROZOLE 1 MG/1
1 TABLET ORAL DAILY
COMMUNITY

## 2020-07-30 ASSESSMENT — ENCOUNTER SYMPTOMS: ABDOMINAL PAIN: 0

## 2020-07-30 NOTE — PROGRESS NOTES
Subjective:      Patient ID: Chelsi Lobato is a 71 y.o. female. HPI  This is a 72 yo female with COPD, Anemia, Depression, and back for follow-up with prior h/o renal cysts. Since last seen on 7/31/19, she has no interval abdominal or flank pain. She has no hematuria or dysuria. I reviewed the interval Renal U/S results in detail with the patient today. She has no new medical or surgical problems. The patient was notified that this is a billable service and has given verbal consent for telehealth services. The visit was conducted via phone in my office. The time spent with patient was 5 minutes. Past Medical History:   Diagnosis Date    Allergic rhinitis     Anemia     Asthma     Depression     Hyperlipidemia      Past Surgical History:   Procedure Laterality Date    BREAST SURGERY Left     cancer    COLONOSCOPY  09/17/2012    COLONOSCOPY  01/25/2018    DR SHERRIE Nuno DILATION AND CURETTAGE OF UTERUS      ROTATOR CUFF REPAIR Right 03/27/2018    TONSILLECTOMY AND ADENOIDECTOMY      TUBAL LIGATION       Social History     Socioeconomic History    Marital status:      Spouse name: None    Number of children: None    Years of education: None    Highest education level: None   Occupational History    None   Social Needs    Financial resource strain: None    Food insecurity     Worry: None     Inability: None    Transportation needs     Medical: None     Non-medical: None   Tobacco Use    Smoking status: Never Smoker    Smokeless tobacco: Never Used   Substance and Sexual Activity    Alcohol use:  Yes    Drug use: No    Sexual activity: Not Currently     Partners: Male   Lifestyle    Physical activity     Days per week: None     Minutes per session: None    Stress: None   Relationships    Social connections     Talks on phone: None     Gets together: None     Attends Cheondoism service: None     Active member of club or organization: None     Attends meetings of clubs or organizations: None     Relationship status: None    Intimate partner violence     Fear of current or ex partner: None     Emotionally abused: None     Physically abused: None     Forced sexual activity: None   Other Topics Concern    None   Social History Narrative    None     Family History   Problem Relation Age of Onset    Cancer Mother         breast    Emphysema Father      Current Outpatient Medications   Medication Sig Dispense Refill    Budesonide-Formoterol Fumarate (SYMBICORT IN) Inhale into the lungs daily as needed      albuterol (PROVENTIL) (2.5 MG/3ML) 0.083% nebulizer solution Inhale into the lungs      loratadine (CLARITIN) 10 MG tablet Take by mouth      montelukast (SINGULAIR) 10 MG tablet TAKE 1 TABLET DAILY 90 tablet 1    Omalizumab (XOLAIR SC) Inject into the skin      fluticasone (FLONASE) 50 MCG/ACT nasal spray 1 spray by Nasal route daily 1 Bottle 3     Current Facility-Administered Medications   Medication Dose Route Frequency Provider Last Rate Last Dose    triamcinolone acetonide (KENALOG-40) injection 80 mg  80 mg Intramuscular Once Lucrecia Cousin, DO         Aleve [naproxen sodium]; Salma-seltzer [aspirin effervescent]; Ibuprofen; Lanolin; Morphine; and Viibryd [vilazodone hcl]  reviewed    Review of Systems   Gastrointestinal: Negative for abdominal pain. Genitourinary: Negative for dysuria, flank pain and hematuria.        Objective:   Physical Exam    US RETROPERITONEAL LIMITED   Status: Final result    Order Providers     Authorizing  Encounter  Billing    Yoselin [address] [facility] Jose Angel Archibald MD            Signed by     Signed  Date/Time   Phone  Pager    Camilo Parmar  7/29/2020  14:36  [phone]     Reading Providers     Read  Date  Phone  Pager    Camilo Parmar  Jul 29, 2020  [phone]     All Reviewers List     Keren Shi MD on 7/29/2020 14:40    Routing History     Priority  Sent On  From  To  Message Type

## 2023-02-03 PROBLEM — R10.30 LOWER ABDOMINAL PAIN: Status: ACTIVE | Noted: 2023-02-03

## 2023-02-03 PROBLEM — Z98.890 S/P LUMPECTOMY, LEFT BREAST: Status: ACTIVE | Noted: 2023-02-03

## 2023-02-03 PROBLEM — R25.2 LEG CRAMPING: Status: ACTIVE | Noted: 2023-02-03

## 2023-02-03 PROBLEM — M15.9 OSTEOARTHRITIS, MULTIPLE SITES: Status: ACTIVE | Noted: 2023-02-03

## 2023-02-03 PROBLEM — R53.83 FATIGUE: Status: ACTIVE | Noted: 2023-02-03

## 2023-02-03 PROBLEM — J45.909 ASTHMA (HHS-HCC): Status: ACTIVE | Noted: 2023-02-03

## 2023-02-03 PROBLEM — M54.9 RIGHT-SIDED BACK PAIN: Status: ACTIVE | Noted: 2023-02-03

## 2023-02-03 PROBLEM — M54.16 LUMBAR RADICULOPATHY: Status: ACTIVE | Noted: 2023-02-03

## 2023-02-03 PROBLEM — N83.8 OVARIAN MASS: Status: ACTIVE | Noted: 2023-02-03

## 2023-02-03 PROBLEM — R19.7 DIARRHEA: Status: ACTIVE | Noted: 2023-02-03

## 2023-02-03 PROBLEM — J30.9 RHINITIS, ALLERGIC: Status: ACTIVE | Noted: 2023-02-03

## 2023-02-03 PROBLEM — N28.1 KIDNEY CYST, ACQUIRED: Status: ACTIVE | Noted: 2023-02-03

## 2023-02-03 PROBLEM — Z85.3 HISTORY OF BREAST CANCER: Status: ACTIVE | Noted: 2023-02-03

## 2023-02-03 PROBLEM — M17.9 DJD (DEGENERATIVE JOINT DISEASE) OF KNEE: Status: ACTIVE | Noted: 2023-02-03

## 2023-02-03 PROBLEM — E78.2 MIXED HYPERLIPIDEMIA: Status: ACTIVE | Noted: 2023-02-03

## 2023-02-03 PROBLEM — I25.2 HISTORY OF NON-ST ELEVATION MYOCARDIAL INFARCTION (NSTEMI): Status: ACTIVE | Noted: 2023-02-03

## 2023-02-03 PROBLEM — M81.0 OSTEOPOROSIS OF MULTIPLE SITES: Status: ACTIVE | Noted: 2023-02-03

## 2023-02-03 PROBLEM — E55.9 VITAMIN D DEFICIENCY: Status: ACTIVE | Noted: 2023-02-03

## 2023-02-03 PROBLEM — J30.2 SEASONAL ALLERGIES: Status: ACTIVE | Noted: 2023-02-03

## 2023-02-03 RX ORDER — OMALIZUMAB 75 MG/.5ML
INJECTION, SOLUTION SUBCUTANEOUS
COMMUNITY
Start: 2022-07-20

## 2023-02-03 RX ORDER — FLUTICASONE PROPIONATE AND SALMETEROL XINAFOATE 45; 21 UG/1; UG/1
AEROSOL, METERED RESPIRATORY (INHALATION)
COMMUNITY
Start: 2020-09-09

## 2023-02-03 RX ORDER — FLUTICASONE PROPIONATE 50 MCG
1 SPRAY, SUSPENSION (ML) NASAL 2 TIMES DAILY
COMMUNITY
Start: 2021-04-09

## 2023-02-03 RX ORDER — ANASTROZOLE 1 MG/1
1 TABLET ORAL DAILY
COMMUNITY
Start: 2020-02-26 | End: 2024-05-13 | Stop reason: SDUPTHER

## 2023-02-03 RX ORDER — OMALIZUMAB 202.5 MG/1.4ML
INJECTION, SOLUTION SUBCUTANEOUS
COMMUNITY
Start: 2022-07-20

## 2023-02-03 RX ORDER — AMMONIUM LACTATE 12 G/100G
LOTION TOPICAL
COMMUNITY
Start: 2021-08-12 | End: 2024-03-28 | Stop reason: SDUPTHER

## 2023-02-03 RX ORDER — ALBUTEROL SULFATE 90 UG/1
POWDER, METERED RESPIRATORY (INHALATION)
COMMUNITY
Start: 2016-09-01

## 2023-05-10 ENCOUNTER — OFFICE VISIT (OUTPATIENT)
Dept: PRIMARY CARE | Facility: CLINIC | Age: 73
End: 2023-05-10
Payer: MEDICARE

## 2023-05-10 VITALS
BODY MASS INDEX: 35.99 KG/M2 | HEART RATE: 63 BPM | HEIGHT: 65 IN | SYSTOLIC BLOOD PRESSURE: 122 MMHG | WEIGHT: 216 LBS | DIASTOLIC BLOOD PRESSURE: 80 MMHG | OXYGEN SATURATION: 98 %

## 2023-05-10 DIAGNOSIS — Z00.00 ENCOUNTER FOR MEDICARE ANNUAL WELLNESS EXAM: Primary | ICD-10-CM

## 2023-05-10 DIAGNOSIS — Z71.89 ACP (ADVANCE CARE PLANNING): ICD-10-CM

## 2023-05-10 DIAGNOSIS — Z00.00 ROUTINE GENERAL MEDICAL EXAMINATION AT HEALTH CARE FACILITY: ICD-10-CM

## 2023-05-10 DIAGNOSIS — E53.8 VITAMIN B12 DEFICIENCY: ICD-10-CM

## 2023-05-10 PROCEDURE — G0439 PPPS, SUBSEQ VISIT: HCPCS | Performed by: FAMILY MEDICINE

## 2023-05-10 RX ORDER — EPINEPHRINE 0.3 MG/.3ML
INJECTION SUBCUTANEOUS
COMMUNITY

## 2023-05-10 RX ORDER — PREDNISONE 20 MG/1
TABLET ORAL
COMMUNITY
End: 2024-03-05 | Stop reason: ALTCHOICE

## 2023-05-10 ASSESSMENT — ENCOUNTER SYMPTOMS
STRIDOR: 0
DIZZINESS: 0
EYE PAIN: 0
DECREASED CONCENTRATION: 0
SPEECH DIFFICULTY: 0
DYSPHORIC MOOD: 0
RHINORRHEA: 0
BLOOD IN STOOL: 0
SORE THROAT: 0
CONSTIPATION: 0
MYALGIAS: 1
ABDOMINAL PAIN: 0
TROUBLE SWALLOWING: 0
POLYDIPSIA: 0
ARTHRALGIAS: 1
SINUS PRESSURE: 0
CHEST TIGHTNESS: 0
FEVER: 0
DEPRESSION: 0
SINUS PAIN: 0
ACTIVITY CHANGE: 0
SHORTNESS OF BREATH: 0
RECTAL PAIN: 0
APPETITE CHANGE: 0
CONSTITUTIONAL NEGATIVE: 1
PALPITATIONS: 0
SEIZURES: 0
LOSS OF SENSATION IN FEET: 0
AGITATION: 0
FATIGUE: 0
ABDOMINAL DISTENTION: 0
ADENOPATHY: 0
HEADACHES: 0
HEMATURIA: 0
NERVOUS/ANXIOUS: 0
DIARRHEA: 0
NECK STIFFNESS: 0
POLYPHAGIA: 0
DYSURIA: 0
CONFUSION: 0
FLANK PAIN: 0
OCCASIONAL FEELINGS OF UNSTEADINESS: 0
PHOTOPHOBIA: 0
SLEEP DISTURBANCE: 0
COLOR CHANGE: 0
COUGH: 0

## 2023-05-10 ASSESSMENT — ACTIVITIES OF DAILY LIVING (ADL)
GROCERY_SHOPPING: INDEPENDENT
BATHING: INDEPENDENT
DRESSING: INDEPENDENT
MANAGING_FINANCES: INDEPENDENT
DOING_HOUSEWORK: INDEPENDENT
TAKING_MEDICATION: INDEPENDENT

## 2023-05-10 ASSESSMENT — PATIENT HEALTH QUESTIONNAIRE - PHQ9
1. LITTLE INTEREST OR PLEASURE IN DOING THINGS: NOT AT ALL
SUM OF ALL RESPONSES TO PHQ9 QUESTIONS 1 AND 2: 0
2. FEELING DOWN, DEPRESSED OR HOPELESS: NOT AT ALL

## 2023-05-10 NOTE — PROGRESS NOTES
Subjective   Reason for Visit: Therese Davison is an 72 y.o. female here for a Medicare Wellness visit.     Past Medical, Surgical, and Family History reviewed and updated in chart.         HPI  Patient presents today following up after being seen at [facility] on 5/7/2023. Patient was seen for having trouble breathing and asthma attack.   Patient currently taking Prednisone and has 2 days until script is finished. Patient states Ibuprofen caused her to go to the ER patient is allergic to it.     Patient c/o of muscular pain in lower back. States she only experiencing the pain when moving. Patient states that pain is improving.     Patient is also being seen for [facility]     Mammogram and colonoscopy [facility]     Patient Care Team:  Lonnie Medina DO as PCP - General  Lonnie Medina DO as PCP - [facility] Attributed Provider     Review of Systems   Constitutional: Negative.  Negative for activity change, appetite change, fatigue and fever.   HENT:  Negative for congestion, dental problem, ear discharge, ear pain, mouth sores, rhinorrhea, sinus pressure, sinus pain, sore throat, tinnitus and trouble swallowing.    Eyes:  Negative for photophobia, pain and visual disturbance.   Respiratory:  Negative for cough, chest tightness, shortness of breath and stridor.    Cardiovascular:  Negative for chest pain and palpitations.   Gastrointestinal:  Negative for abdominal distention, abdominal pain, blood in stool, constipation, diarrhea and rectal pain.   Endocrine: Negative for cold intolerance, heat intolerance, polydipsia, polyphagia and polyuria.   Genitourinary:  Negative for dysuria, flank pain, hematuria and urgency.   Musculoskeletal:  Positive for arthralgias and myalgias. Negative for gait problem and neck stiffness.   Skin:  Negative for color change and rash.   Allergic/Immunologic: Negative for environmental allergies and food allergies.   Neurological:  Negative for dizziness, seizures, syncope, speech  "difficulty and headaches.   Hematological:  Negative for adenopathy.   Psychiatric/Behavioral:  Negative for agitation, confusion, decreased concentration, dysphoric mood and sleep disturbance. The patient is not nervous/anxious.        Objective   Vitals:  /80   Pulse 63   Ht 1.651 m (5' 5\")   Wt 98 kg (216 lb)   SpO2 98%   BMI 35.94 kg/m²       Physical Exam  Vitals reviewed.   Constitutional:       General: She is not in acute distress.     Appearance: Normal appearance. She is obese. She is not ill-appearing or diaphoretic.   HENT:      Head: Normocephalic.      Right Ear: Tympanic membrane and external ear normal.      Left Ear: Tympanic membrane and external ear normal.      Nose: Nose normal. No congestion.      Mouth/Throat:      Mouth: Mucous membranes are dry.      Pharynx: No posterior oropharyngeal erythema.   Eyes:      General:         Right eye: No discharge.         Left eye: No discharge.      Extraocular Movements: Extraocular movements intact.      Conjunctiva/sclera: Conjunctivae normal.      Pupils: Pupils are equal, round, and reactive to light.   Cardiovascular:      Rate and Rhythm: Normal rate and regular rhythm.      Pulses: Normal pulses.      Heart sounds: Normal heart sounds. No murmur heard.  Pulmonary:      Effort: Pulmonary effort is normal. No respiratory distress.      Breath sounds: No wheezing or rales.      Comments: Diminished breath sounds without wheezing  Chest:      Chest wall: No tenderness.   Abdominal:      General: Abdomen is flat. Bowel sounds are normal. There is no distension.      Palpations: There is no mass.      Tenderness: There is no abdominal tenderness. There is no guarding.   Genitourinary:     Rectum: Normal.   Musculoskeletal:         General: No tenderness. Normal range of motion.      Cervical back: Normal range of motion and neck supple. No tenderness.      Right lower leg: No edema.      Left lower leg: No edema.   Skin:     General: Skin is " warm and dry.      Coloration: Skin is not jaundiced.      Findings: No bruising or erythema.   Neurological:      General: No focal deficit present.      Mental Status: She is alert and oriented to person, place, and time. Mental status is at baseline.      Cranial Nerves: No cranial nerve deficit.      Sensory: No sensory deficit.      Coordination: Coordination normal.      Gait: Gait normal.   Psychiatric:         Mood and Affect: Mood normal.         Thought Content: Thought content normal.         Judgment: Judgment normal.         Assessment/Plan   Problem List Items Addressed This Visit    None  Visit Diagnoses       Encounter for Medicare annual wellness exam    -  Primary    ACP (advance care planning)        Vitamin B12 deficiency        Relevant Medications    cyanocobalamin (Vitamin B-12) 1,000 mcg tablet    Routine general medical examination at health care facility                  Scribe Attestation  By signing my name below, IMickie RMA , Geri   attest that this documentation has been prepared under the direction and in the presence of Lonnie Medina DO.   Provider Attestation - Scribe documentation    All medical record entries made by the Scribe were at my direction and personally dictated by me. I have reviewed the chart and agree that the record accurately reflects my personal performance of the history, physical exam, discussion and plan.

## 2023-05-10 NOTE — PATIENT INSTRUCTIONS
Follow up in 3 months    Continue current medications and therapy for chronic medical conditions.    Patient was advised importance of proper diet/nutrition in addition adequate hydration. Patient was encouraged moderate exercise program to include 30 minutes daily for 5 days of the week or 150 minutes weekly. Patient will follow-up with us as scheduled.    Review lab results from May 2023    Start B12 1000 mcg daily    Complete Medicare annual wellness physical/exam

## 2023-05-11 RX ORDER — LANOLIN ALCOHOL/MO/W.PET/CERES
1000 CREAM (GRAM) TOPICAL DAILY
Qty: 90 TABLET | Refills: 1 | Status: SHIPPED | OUTPATIENT
Start: 2023-05-11 | End: 2023-11-07

## 2023-07-30 NOTE — MR AVS SNAPSHOT
Problem: Adult Inpatient Plan of Care  Goal: Plan of Care Review  Outcome: Ongoing, Progressing  Goal: Patient-Specific Goal (Individualized)  Outcome: Ongoing, Progressing  Goal: Absence of Hospital-Acquired Illness or Injury  Outcome: Ongoing, Progressing  Goal: Optimal Comfort and Wellbeing  Outcome: Ongoing, Progressing  Goal: Readiness for Transition of Care  Outcome: Ongoing, Progressing     Problem: Infection  Goal: Absence of Infection Signs and Symptoms  Outcome: Ongoing, Progressing     Problem: Fall Injury Risk  Goal: Absence of Fall and Fall-Related Injury  Outcome: Ongoing, Progressing      people who are more muscular. Even a small weight loss (between 5 and 10 percent of your current weight) by decreasing your calorie intake and becoming more physically active will help lower your risk of developing or worsening diseases associated with obesity. Learn more at: [employer]             Today's Medication Changes          These changes are accurate as of: 10/2/17  1:37 PM.  If you have any questions, ask your nurse or doctor.                STOP taking these medications           fexofenadine 180 MG tablet   Commonly known as:  ALLEGRA ALLERGY   Stopped by:  Ramsey Camargo, DO       furosemide 20 MG tablet   Commonly known as:  LASIX   Stopped by:  Ramsey Camargo, DO               Your Current Medications Are              predniSONE (DELTASONE) 10 MG tablet TAKE AS DIRECTED.    ipratropium (ATROVENT) 0.02 % nebulizer solution Take 2.5 mLs by nebulization every 4 hours as needed for Wheezing    Efinaconazole (JUBLIA) 10 % SOLN Apply 10 mg topically 2 times daily    fluticasone (FLONASE) 50 MCG/ACT nasal spray 1 spray by Nasal route daily    montelukast (SINGULAIR) 10 MG tablet Take 1 tablet by mouth daily    budesonide-formoterol (SYMBICORT) 160-4.5 MCG/ACT AERO Inhale 2 puffs into the lungs 2 times daily    albuterol (PROVENTIL) (2.5 MG/3ML) 0.083% nebulizer solution Take 3 mLs by nebulization every 6 hours as needed for Wheezing    albuterol sulfate (PROAIR RESPICLICK) 972 (90 BASE) MCG/ACT aerosol powder inhalation Inhale 2 puffs into the lungs every 4 hours as needed for Wheezing or Shortness of Breath    acyclovir (ZOVIRAX) 400 MG tablet Take 1 tablet by mouth as needed (as needed)      Allergies              Aleve [Naproxen Sodium]     Causes asthma attacks    Ibuprofen Other (See Comments)    Causes asthma attacks    Lanolin     Morphine     Viibryd [Vilazodone Hcl]     Nausea, dizziness and diarrhea      We Ordered/Performed the following 47535 - MD INHAL RX, AIRWAY OBST/DX SPUTUM INDUCT          Additional Information        Basic Information     Date Of Birth Sex Race Ethnicity Preferred Language    1950 Female White Non-/Non  English      Problem List as of 10/2/2017  Date Reviewed: 10/2/2017                Mixed hyperlipidemia    Mild single current episode of major depressive disorder ([facility] [address]    Non-seasonal allergic rhinitis due to pollen    Mild intermittent asthma      Immunizations as of 10/2/2017     Name Date    Pneumococcal 13-valent Conjugate (Alma Delia Whitney) 10/27/2015    Td 12/1/2002      Preventive Care        Date Due    Hepatitis C screening is recommended for all adults regardless of risk factors born between [facility] at least once (lifetime) who have never been tested. 1950    Tetanus Combination Vaccine (1 - Tdap) 12/2/2002    Pneumococcal Vaccines (two) for all adults aged 72 and over (2 of 2 - PPSV23) 10/27/2016    Yearly Flu Vaccine (1) 9/1/2017    Mammograms are recommended every 2 years for low/average risk patients aged 48 - 69, and every year for high risk patients per updated national guidelines. However these guidelines can be individualized by your provider. 9/25/2017    Cholesterol Screening 9/4/2020    Colonoscopy 9/17/2022            Babble Signup           Our records indicate that you have an active Babble account. You can view your After Visit Summary by going to [email or web address] and logging in with your Babble username and password. If you don't have a Babble username and password but a parent or guardian has access to your record, the parent or guardian should login with their own Babble username and password and access your record to view the After Visit Summary. Additional Information  If you have questions, please contact the physician practice where you receive care. Remember, Babble is NOT to be used for urgent needs.  For medical emergencies, dial 911. For questions regarding your demandmartt account call [phone]. If you have a clinical question, please call your doctor's office.

## 2023-09-18 ENCOUNTER — TELEPHONE (OUTPATIENT)
Dept: PRIMARY CARE | Facility: CLINIC | Age: 73
End: 2023-09-18

## 2023-09-18 ENCOUNTER — TELEMEDICINE (OUTPATIENT)
Dept: PRIMARY CARE | Facility: CLINIC | Age: 73
End: 2023-09-18
Payer: MEDICARE

## 2023-09-18 DIAGNOSIS — J00 NASOPHARYNGITIS: Primary | ICD-10-CM

## 2023-09-18 DIAGNOSIS — R05.9 COUGH IN ADULT PATIENT: ICD-10-CM

## 2023-09-18 DIAGNOSIS — J34.89 NASAL CONGESTION WITH RHINORRHEA: ICD-10-CM

## 2023-09-18 DIAGNOSIS — R09.81 NASAL CONGESTION WITH RHINORRHEA: ICD-10-CM

## 2023-09-18 DIAGNOSIS — U07.1 COVID: ICD-10-CM

## 2023-09-18 PROCEDURE — 99213 OFFICE O/P EST LOW 20 MIN: CPT | Performed by: NURSE PRACTITIONER

## 2023-09-18 RX ORDER — AZITHROMYCIN 500 MG/1
500 TABLET, FILM COATED ORAL DAILY
Qty: 7 TABLET | Refills: 0 | Status: SHIPPED | OUTPATIENT
Start: 2023-09-18 | End: 2023-09-25

## 2023-09-18 ASSESSMENT — ENCOUNTER SYMPTOMS
RHINORRHEA: 1
LOSS OF SENSATION IN FEET: 0
SORE THROAT: 1
SINUS PAIN: 1
DEPRESSION: 0
HEADACHES: 1
OCCASIONAL FEELINGS OF UNSTEADINESS: 0
DIARRHEA: 1
NAUSEA: 1
COUGH: 1

## 2023-09-18 ASSESSMENT — PATIENT HEALTH QUESTIONNAIRE - PHQ9
1. LITTLE INTEREST OR PLEASURE IN DOING THINGS: NOT AT ALL
2. FEELING DOWN, DEPRESSED OR HOPELESS: NOT AT ALL
SUM OF ALL RESPONSES TO PHQ9 QUESTIONS 1 AND 2: 0
2. FEELING DOWN, DEPRESSED OR HOPELESS: NOT AT ALL
1. LITTLE INTEREST OR PLEASURE IN DOING THINGS: NOT AT ALL
SUM OF ALL RESPONSES TO PHQ9 QUESTIONS 1 AND 2: 0

## 2023-09-18 NOTE — PROGRESS NOTES
Subjective   Patient ID: Therese Davison is a 72 y.o. female who presents for URI.    Pt tested positive for covid today.    URI   This is a new problem. The current episode started today. The problem has been gradually worsening. The maximum temperature recorded prior to her arrival was 101 - 101.9 F. Associated symptoms include congestion, coughing, diarrhea, headaches, nausea, rhinorrhea, sinus pain, sneezing and a sore throat. She has tried acetaminophen for the symptoms. The treatment provided mild relief.        Review of Systems   HENT:  Positive for congestion, rhinorrhea, sinus pain, sneezing and sore throat.    Respiratory:  Positive for cough.    Gastrointestinal:  Positive for diarrhea and nausea.   Neurological:  Positive for headaches.       Objective   There were no vitals taken for this visit.    Physical Exam    Assessment/Plan

## 2023-09-18 NOTE — TELEPHONE ENCOUNTER
Dr Medina pt    Pt phoned office and stated she tested positive this morning for covid. Her symptoms started on 9/16/23. Pt has symptoms of a head cold and is very fatigue. Pt would like paxlovid sent over.    [facility] beatriz Styles

## 2023-09-18 NOTE — PROGRESS NOTES
Subjective   Patient ID: Therese Davison is a 72 y.o. female who is with a chief complaint of symptoms of respiratory tract infection.     HPI  Patient is a 72 y.o. female who CONSULTED AT [facility] - PHONE ONLY today. Patient is with complaints of nasal congestion, nasal discharge, headache / sinus pain, cough, fatigue, muscle ache, intermittent diarrhea, chills and fever. She denies having sore throat, loss of sense of taste, nor loss of sense of smell. Patient states that present condition started about 3 days ago. after being exposed to NONE who recently tested positive for COVID. she denies shortness of breath, chest pain, palpitations, nor edema. she stated that she  tried OTC medications which afforded only slight relief of symptoms. she denies nausea, vomiting, abdominal pain, nor any other symptoms.    Patient states she had her COVID vaccine.  Patient states she had the flu shot for this season.  Patient states she underwent testing for COVID earlier today and the result was POSITIVE.    Review of Systems  General: no weight loss, generally healthy, (+) fatigue  Head: (+) headaches / sinus pain, no vertigo, no injury  Eyes: no diplopia, no tearing, no pain,   Ears: no change in hearing, no tinnitus, no bleeding, no vertigo  Mouth:  no dental difficulties, no gingival bleeding, no sore throat, no loss of sense of taste  Nose: (+) congestion, (+) discharge, no bleeding, no obstruction, no loss of sense of smell  Neck: no stiffness, no pain, no tenderness, no masses, no bruit  Pulmonary: no dyspnea, no wheezing, no hemoptysis, (+) cough  Cardiovascular: no chest pain, no palpitations, no syncope, no orthopnea  Gastrointestinal: no change in appetite, no dysphagia, no abdominal pains, (+) intermittent diarrhea, no emesis, no melena  Genito Urinary: no dysuria, no urinary urgency, no nocturia, no incontinence, no change in nature of urine  Musculoskeletal: (+) muscle ache, no joint  pain, no limitation of range of motion, no paresthesia, no numbness  Constitutional: (+) fever, (+) chills, no night sweats    Objective   NO VITAL SIGNS TAKEN FOR THIS PATIENT (VIRTUAL VISIT CONSULT - PHONE ONLY)    Physical Exam  PHYSICAL EXAMINATION WAS NOT DONE FOR THIS PATIENT (VIRTUAL VISIT CONSULT - PHONE ONLY)    Assessment/Plan   Problem List Items Addressed This Visit    None  Visit Diagnoses       Nasopharyngitis    -  Primary    Relevant Medications    azithromycin (Zithromax) 500 mg tablet    Cough in adult patient        Relevant Medications    azithromycin (Zithromax) 500 mg tablet    Nasal congestion with rhinorrhea        Relevant Medications    azithromycin (Zithromax) 500 mg tablet    COVID        Relevant Medications    azithromycin (Zithromax) 500 mg tablet        DISCHARGE SUMMARY:   Diagnosis, treatment, treatment options, and possible complications of today's illness discussed and explained to patient. Patient may take OTC decongestant of choice as needed. Patient to take medication/s associated with this visit. Patient may also take OTC analgesic/antipyretic if needed for pain/fever. Advised to increase oral fluid intake. Advised steam inhalation if needed to relieve congestion. Advised warm saline gargle if needed to relieve throat discomfort. Advised to follow instructions with regards to COVID testing. Advised on social distancing and communicability prevention. Advised to come back if with worsening or persistent symptoms. Patient verbalized understanding of plan of care.    Patient educated on the antiviral medication paxlovid. Patient given explanation on the possible side effects, indications, interactions, and contraindications of using this medication. Patient decided to decline medication at this time but agreed to call if decision changes.    Patient to come back in 7 - 10 days if needed for worsening symptoms.

## 2023-09-18 NOTE — PATIENT INSTRUCTIONS
DISCHARGE SUMMARY:   Diagnosis, treatment, treatment options, and possible complications of today's illness discussed and explained to patient. Patient may take OTC decongestant of choice as needed. Patient to take medication/s associated with this visit. Patient may also take OTC analgesic/antipyretic if needed for pain/fever. Advised to increase oral fluid intake. Advised steam inhalation if needed to relieve congestion. Advised warm saline gargle if needed to relieve throat discomfort. Advised to follow instructions with regards to COVID testing. Advised on social distancing and communicability prevention. Advised to come back if with worsening or persistent symptoms. Patient verbalized understanding of plan of care.    Patient educated on the antiviral medication paxlovid. Patient given explanation on the possible side effects, indications, interactions, and contraindications of using this medication. Patient decided to decline medication at this time but agreed to call if decision changes.    Patient to come back in 7 - 10 days if needed for worsening symptoms.

## 2023-10-06 PROBLEM — M54.9 RIGHT-SIDED BACK PAIN: Status: RESOLVED | Noted: 2023-02-03 | Resolved: 2023-10-06

## 2023-10-06 PROBLEM — L82.1 OTHER SEBORRHEIC KERATOSIS: Status: ACTIVE | Noted: 2022-08-16

## 2023-10-06 PROBLEM — D22.39 MELANOCYTIC NEVI OF OTHER PARTS OF FACE: Status: ACTIVE | Noted: 2022-08-16

## 2023-10-06 PROBLEM — M81.0 OSTEOPOROSIS: Status: ACTIVE | Noted: 2023-10-06

## 2023-10-06 PROBLEM — D22.5 MELANOCYTIC NEVI OF TRUNK: Status: ACTIVE | Noted: 2022-08-16

## 2023-10-06 PROBLEM — L81.4 OTHER MELANIN HYPERPIGMENTATION: Status: ACTIVE | Noted: 2022-08-16

## 2023-10-06 PROBLEM — D22.60 MELANOCYTIC NEVI OF UNSPECIFIED UPPER LIMB, INCLUDING SHOULDER: Status: ACTIVE | Noted: 2022-08-16

## 2023-10-06 PROBLEM — R10.30 LOWER ABDOMINAL PAIN: Status: RESOLVED | Noted: 2023-02-03 | Resolved: 2023-10-06

## 2023-10-06 PROBLEM — Z98.890 S/P LUMPECTOMY, LEFT BREAST: Status: RESOLVED | Noted: 2023-02-03 | Resolved: 2023-10-06

## 2023-10-06 PROBLEM — D18.01 HEMANGIOMA OF SKIN AND SUBCUTANEOUS TISSUE: Status: ACTIVE | Noted: 2022-08-16

## 2023-10-06 PROBLEM — Z85.828 PERSONAL HISTORY OF OTHER MALIGNANT NEOPLASM OF SKIN: Status: ACTIVE | Noted: 2022-08-16

## 2023-10-06 PROBLEM — R19.7 DIARRHEA: Status: RESOLVED | Noted: 2023-02-03 | Resolved: 2023-10-06

## 2023-10-06 PROBLEM — L57.0 ACTINIC KERATOSIS: Status: ACTIVE | Noted: 2022-08-16

## 2023-10-06 PROBLEM — L90.5 SCAR CONDITION AND FIBROSIS OF SKIN: Status: ACTIVE | Noted: 2022-08-16

## 2023-10-06 PROBLEM — E53.8 VITAMIN B12 DEFICIENCY: Status: ACTIVE | Noted: 2023-10-06

## 2023-10-06 PROBLEM — D22.30 MELANOCYTIC NEVI OF FACE: Status: ACTIVE | Noted: 2021-08-12

## 2023-10-06 PROBLEM — C44.41 BASAL CELL CARCINOMA OF SKIN OF SCALP AND NECK: Status: ACTIVE | Noted: 2022-08-16

## 2023-10-06 PROBLEM — D48.5 NEOPLASM OF UNCERTAIN BEHAVIOR OF SKIN: Status: ACTIVE | Noted: 2022-08-16

## 2023-10-06 PROBLEM — Z79.811 USE OF ANASTROZOLE: Status: ACTIVE | Noted: 2023-10-06

## 2023-10-06 PROBLEM — C44.81 BASAL CELL CARCINOMA (BCC) OF OVERLAPPING SITES OF SKIN: Status: ACTIVE | Noted: 2021-12-02

## 2023-10-06 PROBLEM — M81.0 OSTEOPOROSIS: Status: RESOLVED | Noted: 2023-10-06 | Resolved: 2023-10-06

## 2023-10-06 PROBLEM — C44.319 BASAL CELL CARCINOMA OF SKIN OF OTHER PARTS OF FACE: Status: ACTIVE | Noted: 2022-08-16

## 2023-10-06 PROBLEM — C44.519 BASAL CELL CARCINOMA OF SKIN OF OTHER PART OF TRUNK: Status: ACTIVE | Noted: 2022-08-16

## 2023-10-06 PROBLEM — D22.70 MELANOCYTIC NEVI OF LOWER LIMB, INCLUDING HIP: Status: ACTIVE | Noted: 2022-08-16

## 2023-10-06 PROBLEM — H25.10 NUCLEAR SCLEROTIC CATARACT: Status: ACTIVE | Noted: 2021-12-02

## 2023-10-06 PROBLEM — C50.919 BREAST CANCER (MULTI): Status: ACTIVE | Noted: 2021-12-02

## 2023-10-06 PROBLEM — Z71.89 ACP (ADVANCE CARE PLANNING): Status: ACTIVE | Noted: 2023-10-06

## 2023-10-06 RX ORDER — AMOXICILLIN AND CLAVULANATE POTASSIUM 875; 125 MG/1; MG/1
1 TABLET, FILM COATED ORAL 2 TIMES DAILY
COMMUNITY
Start: 2023-08-11 | End: 2024-03-05 | Stop reason: ALTCHOICE

## 2023-10-06 RX ORDER — ALBUTEROL SULFATE 0.83 MG/ML
2.5 SOLUTION RESPIRATORY (INHALATION) EVERY 4 HOURS PRN
COMMUNITY
Start: 2023-05-17

## 2023-10-06 RX ORDER — METRONIDAZOLE 500 MG/1
500 TABLET ORAL 3 TIMES DAILY
COMMUNITY
End: 2024-03-05 | Stop reason: ALTCHOICE

## 2023-10-06 RX ORDER — TOBRAMYCIN AND DEXAMETHASONE 3; 1 MG/ML; MG/ML
1 SUSPENSION/ DROPS OPHTHALMIC 4 TIMES DAILY
COMMUNITY
Start: 2023-05-31 | End: 2024-03-05 | Stop reason: ALTCHOICE

## 2023-10-06 RX ORDER — VALACYCLOVIR HYDROCHLORIDE 1 G/1
2000 TABLET, FILM COATED ORAL
COMMUNITY
Start: 2023-08-31

## 2023-10-06 RX ORDER — LOTEPREDNOL ETABONATE 5 MG/ML
1 SUSPENSION/ DROPS OPHTHALMIC 2 TIMES DAILY
COMMUNITY
Start: 2023-07-26 | End: 2024-03-05 | Stop reason: WASHOUT

## 2023-10-06 RX ORDER — ASCORBIC ACID 125 MG
TABLET,CHEWABLE ORAL
COMMUNITY

## 2023-10-13 ENCOUNTER — OFFICE VISIT (OUTPATIENT)
Dept: ORTHOPEDIC SURGERY | Facility: CLINIC | Age: 73
End: 2023-10-13
Payer: MEDICARE

## 2023-10-13 DIAGNOSIS — M17.11 PRIMARY OSTEOARTHRITIS OF RIGHT KNEE: Primary | ICD-10-CM

## 2023-10-13 PROCEDURE — 99024 POSTOP FOLLOW-UP VISIT: CPT | Performed by: ORTHOPAEDIC SURGERY

## 2023-10-13 PROCEDURE — 2500000004 HC RX 250 GENERAL PHARMACY W/ HCPCS (ALT 636 FOR OP/ED): Mod: PN | Performed by: ORTHOPAEDIC SURGERY

## 2023-10-13 PROCEDURE — 1160F RVW MEDS BY RX/DR IN RCRD: CPT | Performed by: ORTHOPAEDIC SURGERY

## 2023-10-13 PROCEDURE — 1036F TOBACCO NON-USER: CPT | Performed by: ORTHOPAEDIC SURGERY

## 2023-10-13 PROCEDURE — 20610 DRAIN/INJ JOINT/BURSA W/O US: CPT | Mod: RT,PN | Performed by: ORTHOPAEDIC SURGERY

## 2023-10-13 PROCEDURE — 1126F AMNT PAIN NOTED NONE PRSNT: CPT | Performed by: ORTHOPAEDIC SURGERY

## 2023-10-13 PROCEDURE — 1159F MED LIST DOCD IN RCRD: CPT | Performed by: ORTHOPAEDIC SURGERY

## 2023-10-13 PROCEDURE — 2500000005 HC RX 250 GENERAL PHARMACY W/O HCPCS: Mod: PN | Performed by: ORTHOPAEDIC SURGERY

## 2023-10-13 RX ORDER — BETAMETHASONE SODIUM PHOSPHATE AND BETAMETHASONE ACETATE 3; 3 MG/ML; MG/ML
12 INJECTION, SUSPENSION INTRA-ARTICULAR; INTRALESIONAL; INTRAMUSCULAR; SOFT TISSUE
Status: COMPLETED | OUTPATIENT
Start: 2023-10-13 | End: 2023-10-13

## 2023-10-13 RX ORDER — LIDOCAINE HYDROCHLORIDE 10 MG/ML
5 INJECTION INFILTRATION; PERINEURAL
Status: COMPLETED | OUTPATIENT
Start: 2023-10-13 | End: 2023-10-13

## 2023-10-13 RX ADMIN — LIDOCAINE HYDROCHLORIDE 5 ML: 10 INJECTION, SOLUTION INFILTRATION; PERINEURAL at 10:45

## 2023-10-13 RX ADMIN — BETAMETHASONE ACETATE AND BETAMETHASONE SODIUM PHOSPHATE 12 MG: 3; 3 INJECTION, SUSPENSION INTRA-ARTICULAR; INTRALESIONAL; INTRAMUSCULAR; SOFT TISSUE at 10:45

## 2023-10-13 NOTE — PROGRESS NOTES
Carli is here for her right knee.  She had known arthritis.  She got several months of relief with cortisone and would like to try it again today.    L Inj/Asp: R knee on 10/13/2023 10:45 AM  Indications: pain  Details: 22 G needle, lateral approach  Medications: 12 mg betamethasone acet,sod phos 6 mg/mL; 5 mL lidocaine 10 mg/mL (1 %)  Outcome: tolerated well, no immediate complications  Procedure, treatment alternatives, risks and benefits explained, specific risks discussed. Consent was given by the patient. Immediately prior to procedure a time out was called to verify the correct patient, procedure, equipment, support staff and site/side marked as required. Patient was prepped and draped in the usual sterile fashion.       She will ice over the next several days and call for recheck at her discretion.  She did not do well with prior gel injections.

## 2023-11-10 ENCOUNTER — LAB (OUTPATIENT)
Dept: LAB | Facility: LAB | Age: 73
End: 2023-11-10
Payer: MEDICARE

## 2023-11-10 ENCOUNTER — OFFICE VISIT (OUTPATIENT)
Dept: PRIMARY CARE | Facility: CLINIC | Age: 73
End: 2023-11-10
Payer: MEDICARE

## 2023-11-10 VITALS
WEIGHT: 218 LBS | SYSTOLIC BLOOD PRESSURE: 122 MMHG | OXYGEN SATURATION: 98 % | RESPIRATION RATE: 15 BRPM | TEMPERATURE: 97 F | BODY MASS INDEX: 36.32 KG/M2 | HEART RATE: 67 BPM | HEIGHT: 65 IN | DIASTOLIC BLOOD PRESSURE: 86 MMHG

## 2023-11-10 DIAGNOSIS — R53.83 FATIGUE, UNSPECIFIED TYPE: ICD-10-CM

## 2023-11-10 DIAGNOSIS — Z23 NEED FOR PNEUMOCOCCAL VACCINATION: ICD-10-CM

## 2023-11-10 DIAGNOSIS — C50.112 MALIGNANT NEOPLASM OF CENTRAL PORTION OF LEFT BREAST IN FEMALE, ESTROGEN RECEPTOR NEGATIVE (MULTI): ICD-10-CM

## 2023-11-10 DIAGNOSIS — J45.20 MILD INTERMITTENT ASTHMA WITHOUT COMPLICATION (HHS-HCC): Primary | ICD-10-CM

## 2023-11-10 DIAGNOSIS — M81.0 OSTEOPOROSIS OF MULTIPLE SITES: ICD-10-CM

## 2023-11-10 DIAGNOSIS — D64.9 ANEMIA, UNSPECIFIED TYPE: ICD-10-CM

## 2023-11-10 DIAGNOSIS — E78.2 MIXED HYPERLIPIDEMIA: ICD-10-CM

## 2023-11-10 DIAGNOSIS — Z23 NEED FOR INFLUENZA VACCINATION: ICD-10-CM

## 2023-11-10 DIAGNOSIS — J41.0 SIMPLE CHRONIC BRONCHITIS (MULTI): ICD-10-CM

## 2023-11-10 DIAGNOSIS — Z17.1 MALIGNANT NEOPLASM OF CENTRAL PORTION OF LEFT BREAST IN FEMALE, ESTROGEN RECEPTOR NEGATIVE (MULTI): ICD-10-CM

## 2023-11-10 DIAGNOSIS — R06.09 DYSPNEA ON EXERTION: ICD-10-CM

## 2023-11-10 DIAGNOSIS — F32.0 MILD SINGLE CURRENT EPISODE OF MAJOR DEPRESSIVE DISORDER (CMS-HCC): ICD-10-CM

## 2023-11-10 LAB
ALBUMIN SERPL BCP-MCNC: 4.2 G/DL (ref 3.4–5)
ALP SERPL-CCNC: 84 U/L (ref 33–136)
ALT SERPL W P-5'-P-CCNC: 11 U/L (ref 7–45)
ANION GAP SERPL CALC-SCNC: 13 MMOL/L (ref 10–20)
AST SERPL W P-5'-P-CCNC: 11 U/L (ref 9–39)
BASOPHILS # BLD AUTO: 0.11 X10*3/UL (ref 0–0.1)
BASOPHILS NFR BLD AUTO: 1.2 %
BILIRUB SERPL-MCNC: 0.4 MG/DL (ref 0–1.2)
BUN SERPL-MCNC: 13 MG/DL (ref 6–23)
CALCIUM SERPL-MCNC: 9.3 MG/DL (ref 8.6–10.3)
CHLORIDE SERPL-SCNC: 104 MMOL/L (ref 98–107)
CHOLEST SERPL-MCNC: 241 MG/DL (ref 0–199)
CHOLESTEROL/HDL RATIO: 3.2
CO2 SERPL-SCNC: 29 MMOL/L (ref 21–32)
CREAT SERPL-MCNC: 0.67 MG/DL (ref 0.5–1.05)
EOSINOPHIL # BLD AUTO: 0.52 X10*3/UL (ref 0–0.4)
EOSINOPHIL NFR BLD AUTO: 5.7 %
ERYTHROCYTE [DISTWIDTH] IN BLOOD BY AUTOMATED COUNT: 14 % (ref 11.5–14.5)
FOLATE SERPL-MCNC: 8.5 NG/ML
GFR SERPL CREATININE-BSD FRML MDRD: >90 ML/MIN/1.73M*2
GLUCOSE SERPL-MCNC: 89 MG/DL (ref 74–99)
HCT VFR BLD AUTO: 38.4 % (ref 36–46)
HDLC SERPL-MCNC: 76.2 MG/DL
HGB BLD-MCNC: 12.3 G/DL (ref 12–16)
IMM GRANULOCYTES # BLD AUTO: 0.02 X10*3/UL (ref 0–0.5)
IMM GRANULOCYTES NFR BLD AUTO: 0.2 % (ref 0–0.9)
IRON SATN MFR SERPL: 20 % (ref 25–45)
IRON SERPL-MCNC: 79 UG/DL (ref 35–150)
LDLC SERPL CALC-MCNC: 145 MG/DL
LYMPHOCYTES # BLD AUTO: 2.79 X10*3/UL (ref 0.8–3)
LYMPHOCYTES NFR BLD AUTO: 30.3 %
MCH RBC QN AUTO: 28.8 PG (ref 26–34)
MCHC RBC AUTO-ENTMCNC: 32 G/DL (ref 32–36)
MCV RBC AUTO: 90 FL (ref 80–100)
MONOCYTES # BLD AUTO: 0.72 X10*3/UL (ref 0.05–0.8)
MONOCYTES NFR BLD AUTO: 7.8 %
NEUTROPHILS # BLD AUTO: 5.04 X10*3/UL (ref 1.6–5.5)
NEUTROPHILS NFR BLD AUTO: 54.8 %
NON HDL CHOLESTEROL: 165 MG/DL (ref 0–149)
NRBC BLD-RTO: 0 /100 WBCS (ref 0–0)
PLATELET # BLD AUTO: 321 X10*3/UL (ref 150–450)
POTASSIUM SERPL-SCNC: 4.6 MMOL/L (ref 3.5–5.3)
PROT SERPL-MCNC: 7 G/DL (ref 6.4–8.2)
RBC # BLD AUTO: 4.27 X10*6/UL (ref 4–5.2)
SODIUM SERPL-SCNC: 141 MMOL/L (ref 136–145)
TIBC SERPL-MCNC: 396 UG/DL (ref 240–445)
TRIGL SERPL-MCNC: 99 MG/DL (ref 0–149)
TSH SERPL-ACNC: 2.81 MIU/L (ref 0.44–3.98)
UIBC SERPL-MCNC: 317 UG/DL (ref 110–370)
VIT B12 SERPL-MCNC: 730 PG/ML (ref 211–911)
VLDL: 20 MG/DL (ref 0–40)
WBC # BLD AUTO: 9.2 X10*3/UL (ref 4.4–11.3)

## 2023-11-10 PROCEDURE — 82746 ASSAY OF FOLIC ACID SERUM: CPT

## 2023-11-10 PROCEDURE — 80053 COMPREHEN METABOLIC PANEL: CPT

## 2023-11-10 PROCEDURE — 36415 COLL VENOUS BLD VENIPUNCTURE: CPT

## 2023-11-10 PROCEDURE — 83540 ASSAY OF IRON: CPT

## 2023-11-10 PROCEDURE — 82607 VITAMIN B-12: CPT

## 2023-11-10 PROCEDURE — 90662 IIV NO PRSV INCREASED AG IM: CPT | Performed by: FAMILY MEDICINE

## 2023-11-10 PROCEDURE — G0009 ADMIN PNEUMOCOCCAL VACCINE: HCPCS | Performed by: FAMILY MEDICINE

## 2023-11-10 PROCEDURE — 80061 LIPID PANEL: CPT

## 2023-11-10 PROCEDURE — 83550 IRON BINDING TEST: CPT

## 2023-11-10 PROCEDURE — 99214 OFFICE O/P EST MOD 30 MIN: CPT | Performed by: FAMILY MEDICINE

## 2023-11-10 PROCEDURE — 84443 ASSAY THYROID STIM HORMONE: CPT

## 2023-11-10 PROCEDURE — G0008 ADMIN INFLUENZA VIRUS VAC: HCPCS | Performed by: FAMILY MEDICINE

## 2023-11-10 PROCEDURE — 85025 COMPLETE CBC W/AUTO DIFF WBC: CPT

## 2023-11-10 PROCEDURE — 90677 PCV20 VACCINE IM: CPT | Performed by: FAMILY MEDICINE

## 2023-11-10 RX ORDER — MUPIROCIN 20 MG/G
OINTMENT TOPICAL 2 TIMES DAILY
COMMUNITY
Start: 2023-09-27

## 2023-11-10 RX ORDER — FLUTICASONE PROPIONATE AND SALMETEROL 100; 50 UG/1; UG/1
1 POWDER RESPIRATORY (INHALATION)
COMMUNITY
End: 2024-03-05 | Stop reason: WASHOUT

## 2023-11-10 ASSESSMENT — ENCOUNTER SYMPTOMS
EYE PAIN: 0
SINUS PAIN: 0
DECREASED CONCENTRATION: 0
SINUS PRESSURE: 0
FEVER: 0
SORE THROAT: 0
CONSTITUTIONAL NEGATIVE: 1
COUGH: 0
APPETITE CHANGE: 0
ABDOMINAL DISTENTION: 0
FATIGUE: 0
AGITATION: 0
RHINORRHEA: 0
SLEEP DISTURBANCE: 0
ADENOPATHY: 0
HEADACHES: 0
NECK STIFFNESS: 0
RECTAL PAIN: 0
MYALGIAS: 0
NERVOUS/ANXIOUS: 0
COLOR CHANGE: 0
SPEECH DIFFICULTY: 0
FLANK PAIN: 0
DYSURIA: 0
SEIZURES: 0
SHORTNESS OF BREATH: 1
POLYPHAGIA: 0
TROUBLE SWALLOWING: 0
BLOOD IN STOOL: 0
DIARRHEA: 0
ABDOMINAL PAIN: 0
CHEST TIGHTNESS: 0
DYSPHORIC MOOD: 0
STRIDOR: 0
DIZZINESS: 0
ARTHRALGIAS: 0
PHOTOPHOBIA: 0
CONFUSION: 0
ACTIVITY CHANGE: 0
CONSTIPATION: 0
HEMATURIA: 0
PALPITATIONS: 0
POLYDIPSIA: 0

## 2023-11-10 NOTE — PATIENT INSTRUCTIONS
Patient was advised importance of proper diet/nutrition in addition adequate hydration. Patient was encouraged moderate exercise program to include 30 minutes daily for 5 days of the week or 150 minutes weekly. Patient will follow-up with us as scheduled.     continue all current medications and therapy for chronic medical conditions     Administer Prevnar 20     Patient educated on RSV. VIS provided for patient     BW ordered.     Bone density ordered.     Follow-up in 3 months

## 2023-11-10 NOTE — PROGRESS NOTES
"Subjective   Patient ID: Therese Davison is a 73 y.o. female who presents for Follow-up and Asthma.    Patient is here for 6 months follow up on asthma.    Patient states went to [facility] on 05/07/2023 with asthma attack. Patient states was diagnosed with Aspirin-exacerbated respiratory disease.    Patient states having shortness of breath some times.     Patient would like have a blood work and bone density ordered.         Review of Systems   Constitutional: Negative.  Negative for activity change, appetite change, fatigue and fever.   HENT:  Negative for congestion, dental problem, ear discharge, ear pain, mouth sores, rhinorrhea, sinus pressure, sinus pain, sore throat, tinnitus and trouble swallowing.    Eyes:  Negative for photophobia, pain and visual disturbance.   Respiratory:  Positive for shortness of breath. Negative for cough, chest tightness and stridor.    Cardiovascular:  Negative for chest pain and palpitations.   Gastrointestinal:  Negative for abdominal distention, abdominal pain, blood in stool, constipation, diarrhea and rectal pain.   Endocrine: Negative for cold intolerance, heat intolerance, polydipsia, polyphagia and polyuria.   Genitourinary:  Negative for dysuria, flank pain, hematuria and urgency.   Musculoskeletal:  Negative for arthralgias, gait problem, myalgias and neck stiffness.   Skin:  Negative for color change and rash.   Allergic/Immunologic: Negative for environmental allergies and food allergies.   Neurological:  Negative for dizziness, seizures, syncope, speech difficulty and headaches.   Hematological:  Negative for adenopathy.   Psychiatric/Behavioral:  Negative for agitation, confusion, decreased concentration, dysphoric mood and sleep disturbance. The patient is not nervous/anxious.      Objective   /86 (BP Location: Left arm, Patient Position: Sitting, BP Cuff Size: Adult)   Pulse 67   Temp 36.1 °C (97 °F)   Resp 15   Ht 1.651 m (5' 5\")   Wt 98.9 " kg (218 lb)   SpO2 98%   BMI 36.28 kg/m²     Physical Exam  Vitals reviewed.   Constitutional:       General: She is not in acute distress.     Appearance: She is obese. She is not ill-appearing or diaphoretic.   HENT:      Head: Normocephalic.      Right Ear: Tympanic membrane and external ear normal.      Left Ear: Tympanic membrane and external ear normal.      Nose: Nose normal. No congestion.      Mouth/Throat:      Pharynx: No posterior oropharyngeal erythema.   Eyes:      General:         Right eye: No discharge.         Left eye: No discharge.      Extraocular Movements: Extraocular movements intact.      Conjunctiva/sclera: Conjunctivae normal.      Pupils: Pupils are equal, round, and reactive to light.   Cardiovascular:      Rate and Rhythm: Normal rate and regular rhythm.      Pulses: Normal pulses.      Heart sounds: Normal heart sounds. No murmur heard.  Pulmonary:      Effort: No respiratory distress.      Breath sounds: No wheezing or rales.      Comments: Diminished breath sounds bilaterally.  Respiratory rate 20, no respiratory stridor.  Chest:      Chest wall: No tenderness.   Abdominal:      General: Bowel sounds are normal. There is distension.      Palpations: There is no mass.      Tenderness: There is no abdominal tenderness. There is no guarding.   Musculoskeletal:         General: No tenderness. Normal range of motion.      Cervical back: Normal range of motion and neck supple. No tenderness.      Right lower leg: No edema.      Left lower leg: No edema.   Skin:     General: Skin is dry.      Coloration: Skin is not jaundiced.      Findings: No bruising, erythema or rash.   Neurological:      General: No focal deficit present.      Mental Status: She is alert and oriented to person, place, and time. Mental status is at baseline.      Cranial Nerves: No cranial nerve deficit.      Sensory: No sensory deficit.      Coordination: Coordination normal.      Gait: Gait normal.   Psychiatric:          Mood and Affect: Mood normal.         Thought Content: Thought content normal.         Judgment: Judgment normal.         Assessment/Plan   Problem List Items Addressed This Visit             ICD-10-CM    Fatigue R53.83    Relevant Orders    Thyroid Stimulating Hormone (Completed)    Mixed hyperlipidemia E78.2    Relevant Orders    CBC and Auto Differential (Completed)    Comprehensive Metabolic Panel (Completed)    Lipid Panel (Completed)    Osteoporosis of multiple sites M81.0    Relevant Orders    XR DEXA bone density    Breast cancer (CMS/[facility]) C50.919     Stable/monitored         COPD (chronic obstructive pulmonary disease) (CMS/[facility]) J44.9    Mild intermittent asthma - Primary J45.20    Mild single current episode of major depressive disorder (CMS/[facility]) F32.0     Other Visit Diagnoses         Codes    Need for influenza vaccination     Z23    Relevant Orders    Flu vaccine, quadrivalent, high-dose, preservative free, age 65y+ (FLUZONE) (Completed)    Anemia, unspecified type     D64.9    Relevant Orders    Vitamin B12 (Completed)    Iron and TIBC (Completed)    Folate (Completed)    Need for pneumococcal vaccination     Z23    Relevant Orders    Pneumococcal conjugate vaccine, 20-valent (PREVNAR 20) (Completed)    Dyspnea on exertion     R06.09    Relevant Orders    Transthoracic Echo (TTE) Complete            Scribe Attestation  By signing my name below, I, JONNY Alfaro   , [employer]   attest that this documentation has been prepared under the direction and in the presence of Lonnie Medina DO.     Provider Attestation - Scribe documentation    All medical record entries made by the Scribe were at my direction and personally dictated by me. I have reviewed the chart and agree that the record accurately reflects my personal performance of the history, physical exam, discussion and plan.

## 2023-11-13 ENCOUNTER — OFFICE VISIT (OUTPATIENT)
Dept: PRIMARY CARE | Facility: CLINIC | Age: 73
End: 2023-11-13
Payer: MEDICARE

## 2023-11-13 VITALS
HEIGHT: 65 IN | HEART RATE: 78 BPM | RESPIRATION RATE: 16 BRPM | WEIGHT: 218 LBS | OXYGEN SATURATION: 95 % | TEMPERATURE: 97.7 F | DIASTOLIC BLOOD PRESSURE: 86 MMHG | BODY MASS INDEX: 36.32 KG/M2 | SYSTOLIC BLOOD PRESSURE: 138 MMHG

## 2023-11-13 DIAGNOSIS — T50.A95A LOCAL REACTION TO PNEUMOCOCCAL VACCINE, INITIAL ENCOUNTER: Primary | ICD-10-CM

## 2023-11-13 PROCEDURE — 99213 OFFICE O/P EST LOW 20 MIN: CPT | Performed by: NURSE PRACTITIONER

## 2023-11-13 ASSESSMENT — ENCOUNTER SYMPTOMS
HEADACHES: 0
LOSS OF SENSATION IN FEET: 0
FEVER: 0
COLOR CHANGE: 1
WHEEZING: 0
WEAKNESS: 0
DIARRHEA: 0
DIZZINESS: 0
SORE THROAT: 0
CHILLS: 0
NUMBNESS: 0
ABDOMINAL PAIN: 0
FATIGUE: 0
VOMITING: 0
COUGH: 0
MYALGIAS: 0
WOUND: 0
ADENOPATHY: 0
PALPITATIONS: 0
SHORTNESS OF BREATH: 0
DEPRESSION: 0
OCCASIONAL FEELINGS OF UNSTEADINESS: 0

## 2023-11-13 ASSESSMENT — PATIENT HEALTH QUESTIONNAIRE - PHQ9
2. FEELING DOWN, DEPRESSED OR HOPELESS: NOT AT ALL
1. LITTLE INTEREST OR PLEASURE IN DOING THINGS: NOT AT ALL
SUM OF ALL RESPONSES TO PHQ9 QUESTIONS 1 AND 2: 0

## 2023-11-13 NOTE — PROGRESS NOTES
"Subjective   Patient ID: Therese Davison is a 73 y.o. female who presents for Medication Reaction.    HPI pt is in office with an allergic reaction to the pneumonia injection  on Friday 11/10/2023. Pt has swelling, a lump and redness pt claims it itches. Pt did not take any OTC antihistamines or pain relievers.    Review of Systems   Constitutional:  Negative for chills, fatigue and fever.   HENT:  Negative for sore throat.    Respiratory:  Negative for cough, shortness of breath and wheezing.    Cardiovascular:  Negative for chest pain and palpitations.   Gastrointestinal:  Negative for abdominal pain, diarrhea and vomiting.   Musculoskeletal:  Negative for myalgias.   Skin:  Positive for color change. Negative for wound.   Neurological:  Negative for dizziness, weakness, numbness and headaches.   Hematological:  Negative for adenopathy.       Objective   /86   Pulse 78   Temp 36.5 °C (97.7 °F) (Temporal)   Resp 16   Ht 1.651 m (5' 5\")   Wt 98.9 kg (218 lb)   SpO2 95%   BMI 36.28 kg/m²     Physical Exam  Vitals and nursing note reviewed.   Constitutional:       General: She is not in acute distress.     Appearance: Normal appearance.   HENT:      Mouth/Throat:      Pharynx: No oropharyngeal exudate or posterior oropharyngeal erythema.   Cardiovascular:      Rate and Rhythm: Normal rate and regular rhythm.      Heart sounds: Normal heart sounds.   Pulmonary:      Effort: Pulmonary effort is normal.      Breath sounds: Normal breath sounds.   Lymphadenopathy:      Upper Body:      Left upper body: No axillary adenopathy.   Skin:     Comments: Left upper arm over deltoid with warm, erythematous patch approximately 7 cm in diameter. Np drainage or pustules noted. Mild tenderness to palpation   Neurological:      Mental Status: She is alert.   Psychiatric:         Mood and Affect: Mood normal.         Behavior: Behavior normal.         Assessment/Plan   Problem List Items Addressed This Visit  "   None  Visit Diagnoses         Codes    Local reaction to pneumococcal vaccine, initial encounter    -  Primary T50.A95A    BMI 36.0-36.9,adult     Z68.36          Discussed with patient likely local reactions to vaccination- common and self-limited. Symptoms usually develop a day or two after administration and may last for several days. They should not be considered reasons for avoiding administration of further doses of the vaccine. Local reactions can be treated with cool compresses for the first hours after symptoms appear or with acetaminophen or nonsteroidal antiinflammatory drugs (NSAIDs) if pain or swelling is troublesome. Follow up in 2-3 days with any worsening or persisting symptoms.

## 2023-11-13 NOTE — PATIENT INSTRUCTIONS
Today you were seen for a local reaction after vaccination. Local reactions to vaccination, such as swelling and redness at the injection site, are common and self-limited. Symptoms usually develop a day or two after administration and may last for several days. They should not be considered reasons for avoiding administration of further doses of the vaccine. Local reactions can be treated with cool compresses for the first hours after symptoms appear or with acetaminophen or nonsteroidal antiinflammatory drugs (NSAIDs) if pain or swelling is troublesome. Follow up in 2-3 days with any persisting symptoms, or sooner with any additional concerns.

## 2023-11-15 ENCOUNTER — TELEPHONE (OUTPATIENT)
Dept: PRIMARY CARE | Facility: CLINIC | Age: 73
End: 2023-11-15
Payer: MEDICARE

## 2023-11-15 DIAGNOSIS — E78.2 MIXED HYPERLIPIDEMIA: ICD-10-CM

## 2023-11-15 NOTE — TELEPHONE ENCOUNTER
PATIENT MADE AWARE AND WOULD LIKE TO HOLD ON MEDICATION AT THIS TIME    ADVISED REPEAT LABS IN 3 MONTHS

## 2023-11-15 NOTE — TELEPHONE ENCOUNTER
----- Message from Lonnie Medina DO sent at 11/12/2023 10:51 PM EST -----  Review of laboratory results indicates most studies within normal limits except for total cholesterol of 241 and LDL cholesterol of 145.  Ranges are usually less than 201 100 respectively.  Recommend low-cholesterol diet, that is, minimizing of red meats, cheeses and fried foods.  Would advise consideration for Crestor 5 mg nightly.  If patient agreeable #90x1 refill.  Please follow-up for repeat lipid profile CMP in 3 months.  Thank you   Post-Care Instructions: I reviewed with the patient in detail post-care instructions. Patient should stay away from the sun and wear sun protection until treated areas are fully healed. Skin finalized with PCA Oxy step 3, Rebalance and Elta Clear Sunscreen.

## 2023-11-17 ENCOUNTER — HOSPITAL ENCOUNTER (OUTPATIENT)
Dept: CARDIOLOGY | Facility: CLINIC | Age: 73
Discharge: HOME | End: 2023-11-17
Payer: MEDICARE

## 2023-11-17 DIAGNOSIS — R06.09 DYSPNEA ON EXERTION: ICD-10-CM

## 2023-11-17 PROCEDURE — 93306 TTE W/DOPPLER COMPLETE: CPT | Performed by: STUDENT IN AN ORGANIZED HEALTH CARE EDUCATION/TRAINING PROGRAM

## 2023-11-17 PROCEDURE — 93306 TTE W/DOPPLER COMPLETE: CPT

## 2023-11-20 ENCOUNTER — ANCILLARY PROCEDURE (OUTPATIENT)
Dept: RADIOLOGY | Facility: CLINIC | Age: 73
End: 2023-11-20
Payer: MEDICARE

## 2023-11-20 DIAGNOSIS — M81.0 OSTEOPOROSIS OF MULTIPLE SITES: ICD-10-CM

## 2023-11-20 LAB
AORTIC VALVE PEAK VELOCITY: 1.56
AV PEAK GRADIENT: 9.7
AVA (PEAK VEL): 2.23
EJECTION FRACTION APICAL 4 CHAMBER: 61.7
EJECTION FRACTION: 63
LEFT ATRIUM VOLUME AREA LENGTH INDEX BSA: 18.3
LEFT VENTRICLE INTERNAL DIMENSION DIASTOLE: 4.26 (ref 3.5–6)
LEFT VENTRICULAR OUTFLOW TRACT DIAMETER: 1.86
MITRAL VALVE E/A RATIO: 0.82
RIGHT VENTRICLE FREE WALL PEAK S': 15
TRICUSPID ANNULAR PLANE SYSTOLIC EXCURSION: 2.1

## 2023-11-20 PROCEDURE — 77080 DXA BONE DENSITY AXIAL: CPT

## 2023-11-20 PROCEDURE — 77080 DXA BONE DENSITY AXIAL: CPT | Performed by: RADIOLOGY

## 2023-11-28 ENCOUNTER — TELEPHONE (OUTPATIENT)
Dept: PRIMARY CARE | Facility: CLINIC | Age: 73
End: 2023-11-28
Payer: MEDICARE

## 2024-01-02 ENCOUNTER — CLINICAL SUPPORT (OUTPATIENT)
Dept: PRIMARY CARE | Facility: CLINIC | Age: 74
End: 2024-01-02
Payer: MEDICARE

## 2024-01-02 DIAGNOSIS — B97.89 SORE THROAT (VIRAL): ICD-10-CM

## 2024-01-02 DIAGNOSIS — R05.1 ACUTE COUGH: ICD-10-CM

## 2024-01-02 DIAGNOSIS — J02.8 SORE THROAT (VIRAL): ICD-10-CM

## 2024-01-02 LAB — POC RAPID STREP: NEGATIVE

## 2024-01-02 PROCEDURE — 87637 SARSCOV2&INF A&B&RSV AMP PRB: CPT

## 2024-01-02 PROCEDURE — 87880 STREP A ASSAY W/OPTIC: CPT | Performed by: FAMILY MEDICINE

## 2024-01-02 RX ORDER — AZITHROMYCIN 250 MG/1
TABLET, FILM COATED ORAL
Qty: 6 TABLET | Refills: 0 | Status: SHIPPED | OUTPATIENT
Start: 2024-01-02 | End: 2024-01-07

## 2024-01-02 NOTE — LETTER
January 11, 2024     Therese Davison  [address] [place] [address]      Dear Ms. Davison:    Below are the results from your recent visit:    LAB RESULTS WITHIN NORMAL LIMITS     Resulted Orders   POCT Rapid Strep A manually resulted   Result Value Ref Range    POC Rapid Strep Negative Negative   Sars-CoV-2 and Influenza A/B PCR   Result Value Ref Range    Flu A Result Not Detected Not Detected    Flu B Result Not Detected Not Detected    Coronavirus 2019, PCR Not Detected Not Detected    Narrative    This assay has received FDA Emergency Use Authorization (EUA) and  is only authorized for the duration of time that circumstances exist to justify the authorization of the emergency use of in vitro diagnostic tests for the detection of SARS-CoV-2 virus and/or diagnosis of COVID-19 infection under section 564(b)(1) of the Act, 21 U.S.C. 360bbb-3(b)(1). Testing for SARS-CoV-2 is only recommended for patients who meet current clinical and/or epidemiological criteria as defined by federal, state, or local public health directives. This assay is an in vitro diagnostic nucleic acid amplification test for the qualitative detection of SARS-CoV-2, Influenza A, and Influenza B from nasopharyngeal specimens and has been validated for use at [facility]. Negative results do not preclude COVID-19 infections or Influenza A/B infections, and should not be used as the sole basis for diagnosis, treatment, or other management decisions. If Influenza A/B and RSV PCR results are negative, testing for Parainfluenza virus, Adenovirus and Metapneumovirus is routinely performed for [facility] pediatric oncology and intensive care inpatients, and is available on other patients by placing an add-on request.    RSV PCR   Result Value Ref Range    RSV PCR Not Detected Not Detected    Narrative    This assay is an FDA-cleared, in vitro diagnostic nucleic acid amplification test for the detection of RSV from nasopharyngeal  specimens, and has been validated for use at [facility]. Negative results do not preclude RSV infections, and should not be used as the sole basis for diagnosis, treatment, or other management decisions. If Influenza A/B and RSV PCR results are negative, testing for Parainfluenza virus, Adenovirus and Metapneumovirus is routinely performed for pediatric oncology and intensive care inpatients at [facility], and is available on other patients by placing an add-on request.         The test results show that your current treatment is working. Please continue your current medication and plan.     If you have any questions or concerns, please don't hesitate to call.         Sincerely,        FREDDY ARMENTA, DO

## 2024-01-03 LAB
FLUAV RNA RESP QL NAA+PROBE: NOT DETECTED
FLUBV RNA RESP QL NAA+PROBE: NOT DETECTED
RSV RNA RESP QL NAA+PROBE: NOT DETECTED
SARS-COV-2 RNA RESP QL NAA+PROBE: NOT DETECTED

## 2024-01-08 ENCOUNTER — APPOINTMENT (OUTPATIENT)
Dept: DERMATOLOGY | Facility: CLINIC | Age: 74
End: 2024-01-08
Payer: MEDICARE

## 2024-02-05 ENCOUNTER — OFFICE VISIT (OUTPATIENT)
Dept: ORTHOPEDIC SURGERY | Facility: CLINIC | Age: 74
End: 2024-02-05
Payer: MEDICARE

## 2024-02-05 DIAGNOSIS — M17.11 PRIMARY OSTEOARTHRITIS OF RIGHT KNEE: Primary | ICD-10-CM

## 2024-02-05 PROCEDURE — 20610 DRAIN/INJ JOINT/BURSA W/O US: CPT | Mod: RT | Performed by: ORTHOPAEDIC SURGERY

## 2024-02-05 PROCEDURE — 1159F MED LIST DOCD IN RCRD: CPT | Performed by: ORTHOPAEDIC SURGERY

## 2024-02-05 PROCEDURE — 2500000004 HC RX 250 GENERAL PHARMACY W/ HCPCS (ALT 636 FOR OP/ED): Performed by: ORTHOPAEDIC SURGERY

## 2024-02-05 PROCEDURE — 3008F BODY MASS INDEX DOCD: CPT | Performed by: ORTHOPAEDIC SURGERY

## 2024-02-05 PROCEDURE — 1126F AMNT PAIN NOTED NONE PRSNT: CPT | Performed by: ORTHOPAEDIC SURGERY

## 2024-02-05 PROCEDURE — 99024 POSTOP FOLLOW-UP VISIT: CPT | Performed by: ORTHOPAEDIC SURGERY

## 2024-02-05 PROCEDURE — 2500000005 HC RX 250 GENERAL PHARMACY W/O HCPCS: Performed by: ORTHOPAEDIC SURGERY

## 2024-02-05 PROCEDURE — 1036F TOBACCO NON-USER: CPT | Performed by: ORTHOPAEDIC SURGERY

## 2024-02-05 RX ORDER — LIDOCAINE HYDROCHLORIDE 10 MG/ML
5 INJECTION INFILTRATION; PERINEURAL
Status: COMPLETED | OUTPATIENT
Start: 2024-02-05 | End: 2024-02-05

## 2024-02-05 RX ORDER — BETAMETHASONE SODIUM PHOSPHATE AND BETAMETHASONE ACETATE 3; 3 MG/ML; MG/ML
12 INJECTION, SUSPENSION INTRA-ARTICULAR; INTRALESIONAL; INTRAMUSCULAR; SOFT TISSUE
Status: COMPLETED | OUTPATIENT
Start: 2024-02-05 | End: 2024-02-05

## 2024-02-05 RX ADMIN — BETAMETHASONE SODIUM PHOSPHATE AND BETAMETHASONE ACETATE 12 MG: 3; 3 INJECTION, SUSPENSION INTRA-ARTICULAR; INTRALESIONAL; INTRAMUSCULAR at 11:17

## 2024-02-05 RX ADMIN — LIDOCAINE HYDROCHLORIDE 5 ML: 10 INJECTION, SOLUTION INFILTRATION; PERINEURAL at 11:17

## 2024-02-05 NOTE — PROGRESS NOTES
Therese is here for the right knee cortisone injection.  She gets about 3 months of relief with the cortisone injection.  She did not do well with gel in the past.    L Inj/Asp: R knee on 2/5/2024 11:17 AM  Indications: pain  Details: 22 G needle, lateral approach  Medications: 12 mg betamethasone acet,sod phos 6 mg/mL; 5 mL lidocaine 10 mg/mL (1 %)  Outcome: tolerated well, no immediate complications  Procedure, treatment alternatives, risks and benefits explained, specific risks discussed. Consent was given by the patient. Immediately prior to procedure a time out was called to verify the correct patient, procedure, equipment, support staff and site/side marked as required. Patient was prepped and draped in the usual sterile fashion.         She will ice and work on range of motion and exercises as directed.    She will follow up on an as-needed basis.

## 2024-03-05 ENCOUNTER — OFFICE VISIT (OUTPATIENT)
Dept: PRIMARY CARE | Facility: CLINIC | Age: 74
End: 2024-03-05
Payer: MEDICARE

## 2024-03-05 VITALS
OXYGEN SATURATION: 98 % | HEART RATE: 62 BPM | SYSTOLIC BLOOD PRESSURE: 126 MMHG | DIASTOLIC BLOOD PRESSURE: 62 MMHG | WEIGHT: 214.6 LBS | BODY MASS INDEX: 35.75 KG/M2 | HEIGHT: 65 IN

## 2024-03-05 DIAGNOSIS — M15.9 PRIMARY OSTEOARTHRITIS INVOLVING MULTIPLE JOINTS: ICD-10-CM

## 2024-03-05 DIAGNOSIS — J41.0 SIMPLE CHRONIC BRONCHITIS (MULTI): ICD-10-CM

## 2024-03-05 DIAGNOSIS — F32.0 MILD SINGLE CURRENT EPISODE OF MAJOR DEPRESSIVE DISORDER (CMS-HCC): ICD-10-CM

## 2024-03-05 DIAGNOSIS — C50.112 MALIGNANT NEOPLASM OF CENTRAL PORTION OF LEFT BREAST IN FEMALE, ESTROGEN RECEPTOR NEGATIVE (MULTI): ICD-10-CM

## 2024-03-05 DIAGNOSIS — Z17.1 MALIGNANT NEOPLASM OF CENTRAL PORTION OF LEFT BREAST IN FEMALE, ESTROGEN RECEPTOR NEGATIVE (MULTI): ICD-10-CM

## 2024-03-05 DIAGNOSIS — H65.03 BILATERAL ACUTE SEROUS OTITIS MEDIA, RECURRENCE NOT SPECIFIED: ICD-10-CM

## 2024-03-05 DIAGNOSIS — J45.50 SEVERE PERSISTENT ASTHMA, UNCOMPLICATED (MULTI): Primary | ICD-10-CM

## 2024-03-05 DIAGNOSIS — J30.9 ALLERGIC RHINITIS, UNSPECIFIED SEASONALITY, UNSPECIFIED TRIGGER: ICD-10-CM

## 2024-03-05 DIAGNOSIS — R42 VERTIGO: ICD-10-CM

## 2024-03-05 PROCEDURE — 1126F AMNT PAIN NOTED NONE PRSNT: CPT | Performed by: FAMILY MEDICINE

## 2024-03-05 PROCEDURE — 1036F TOBACCO NON-USER: CPT | Performed by: FAMILY MEDICINE

## 2024-03-05 PROCEDURE — 96372 THER/PROPH/DIAG INJ SC/IM: CPT | Performed by: FAMILY MEDICINE

## 2024-03-05 PROCEDURE — 3008F BODY MASS INDEX DOCD: CPT | Performed by: FAMILY MEDICINE

## 2024-03-05 PROCEDURE — 1159F MED LIST DOCD IN RCRD: CPT | Performed by: FAMILY MEDICINE

## 2024-03-05 PROCEDURE — 99214 OFFICE O/P EST MOD 30 MIN: CPT | Performed by: FAMILY MEDICINE

## 2024-03-05 RX ORDER — CEFTRIAXONE 1 G/1
1 INJECTION, POWDER, FOR SOLUTION INTRAMUSCULAR; INTRAVENOUS ONCE
Status: COMPLETED | OUTPATIENT
Start: 2024-03-05 | End: 2024-03-05

## 2024-03-05 RX ORDER — CEFDINIR 300 MG/1
600 CAPSULE ORAL DAILY
Qty: 20 CAPSULE | Refills: 0 | Status: SHIPPED | OUTPATIENT
Start: 2024-03-05 | End: 2024-03-15

## 2024-03-05 RX ORDER — MECLIZINE HYDROCHLORIDE 25 MG/1
25 TABLET ORAL 3 TIMES DAILY PRN
Qty: 30 TABLET | Refills: 0 | Status: SHIPPED | OUTPATIENT
Start: 2024-03-05 | End: 2025-03-05

## 2024-03-05 RX ADMIN — CEFTRIAXONE 1 G: 1 INJECTION, POWDER, FOR SOLUTION INTRAMUSCULAR; INTRAVENOUS at 15:24

## 2024-03-05 NOTE — PROGRESS NOTES
Subjective   Patient ID: Therese Davison is a 73 y.o. female who presents for Vertigo.    Patient states she had vertigo last night from 9 pm till she went to bed, states she couldn't even walk by herself in her home and feels she still has intermittent vertigo during the day today when she gets up fast or when she moves quickly, states she has noticed her eye is blood shot and her vision is off as well and also feel she can't hear out of her right ear.     Patient would like to discuss her bone density test and echocardiogram     Denies any other concerns at this time.            Review of Systems  12 Systems have been reviewed as follows.   Constitutional: Fever, weight gain, weight loss, appetite change, night sweats, fatigue, chills.  Eyes : blurry, double vision, vision, loss, tearing, redness, pain, sensitivity to light, glaucoma.  Ears, nose, mouth, and throat: Hearing loss, ringing in the ears, ear pain, nasal congestion, nasal drainage, nosebleeds, mouth, throat, irritation tooth problem.  Cardiovascular :chest pain, pressure, heart racing, palpitations, sweating, leg swelling, high or low blood pressure  Pulmonary: Cough, yellow or green sputum, blood and sputum, shortness of breath, wheezing  Gastrointestinal: Nausea, vomiting, diarrhea, constipation, pain, blood in stool, or vomitus, heartburn, difficulty swallowing  Genitourinary: incontinence, abnormal bleeding, abnormal discharge, urinary frequency, urinary hesitancy, pain, impotence sexual problem, infection, urinary retention  Musculoskeletal: Pain, stiffness, joint, redness or warmth, arthritis, back pain, weakness, muscle wasting, sprain or fracture  Neuro: Weight weakness, dizziness, change in voice, change in taste change in vision, change in hearing, loss, or change of sensation, trouble walking, balance problems coordination problems, shaking, speech problem  Endocrine , cold or heat intolerance, blood sugar problem, weight gain or loss  "missed periods hot flashes, sweats, change in body hair, change in libido, increased thirst, increased urination  Heme/lymph: Swelling, bleeding, problem anemia, bruising, enlarged lymph nodes  Allergic/immunologic: H. plus nasal drip, watery itchy eyes, nasal drainage, immunosuppressed  The above were reviewed and noted negative except as noted in HPI and Problem List.      Objective   /62   Pulse 62   Ht 1.651 m (5' 5\")   Wt 97.3 kg (214 lb 9.6 oz)   SpO2 98%   BMI 35.71 kg/m²     Physical Exam  Constitutional: Well developed, well nourished, alert and in no acute distress  Eyes: Normal external exam. Pupils equally round and reactive to light with normal accommodation and extraocular movements intact.  Neck: Supple, no lymphadenopathy or masses.  Cardiovascular: Regular rate and rhythm, normal S1 and S2, no murmurs, gallops, or rubs. Radial pulses normal. No peripheral edema.  Abdomen: soft, non tender, distended, no masses or HSM.  Pulmonary: No respiratory distress, lungs clear to auscultation bilaterally. No wheezes, rhonchi, rales.  Skin: Warm, well perfused, normal skin turgor and color.  Neurologic: Cranial nerves II-XII grossly intact.  Psychiatric: Mood calm and affect normal  Musculoskeletal: Moving all extremities without restriction      Assessment/Plan   Problem List Items Addressed This Visit             ICD-10-CM    Rhinitis, allergic J30.9    Relevant Orders    Follow Up In [facility] - PCP - Established    Osteoarthritis, multiple sites M15.9    Breast cancer (CMS/[facility]) C50.919    COPD (chronic obstructive pulmonary disease) (CMS/[facility]) J44.9    Mild single current episode of major depressive disorder (CMS/[facility]) F32.0    Vertigo R42    Relevant Medications    meclizine (Antivert) 25 mg tablet    Other Relevant Orders    Follow Up In [facility] - PCP - Established    Severe persistent asthma, uncomplicated - Primary J45.50     Other Visit Diagnoses         Codes    " Bilateral acute serous otitis media, recurrence not specified     H65.03    Relevant Medications    cefdinir (Omnicef) 300 mg capsule    cefTRIAXone (Rocephin) vial 1 g (Start on 3/5/2024  3:30 PM)    Other Relevant Orders    Follow Up In [facility] - PCP - Established          Scribe Attestation  By signing my name below, I, Garret Alcantara MD   , Scribe   attest that this documentation has been prepared under the direction and in the presence of Garret Alcantara MD.    Provider Attestation - Scribe documentation    All medical record entries made by the Scribe were at my direction and personally dictated by me. I have reviewed the chart and agree that the record accurately reflects my personal performance of the history, physical exam, discussion and plan.       Rocpehin injection today    Omnicef x 10 days    Repeat bone density 2026    Meclizine 25 mg as needed    To ER if sx worsen

## 2024-03-13 ENCOUNTER — TELEMEDICINE (OUTPATIENT)
Dept: PRIMARY CARE | Facility: CLINIC | Age: 74
End: 2024-03-13
Payer: MEDICARE

## 2024-03-13 DIAGNOSIS — J30.9 ALLERGIC RHINITIS, UNSPECIFIED SEASONALITY, UNSPECIFIED TRIGGER: ICD-10-CM

## 2024-03-13 DIAGNOSIS — R42 VERTIGO: ICD-10-CM

## 2024-03-13 DIAGNOSIS — E78.2 MIXED HYPERLIPIDEMIA: ICD-10-CM

## 2024-03-13 DIAGNOSIS — D64.9 ANEMIA, UNSPECIFIED TYPE: ICD-10-CM

## 2024-03-13 DIAGNOSIS — E55.9 VITAMIN D DEFICIENCY: ICD-10-CM

## 2024-03-13 DIAGNOSIS — R53.82 CHRONIC FATIGUE: ICD-10-CM

## 2024-03-13 DIAGNOSIS — H65.03 BILATERAL ACUTE SEROUS OTITIS MEDIA, RECURRENCE NOT SPECIFIED: ICD-10-CM

## 2024-03-13 PROCEDURE — 99442 PR PHYS/QHP TELEPHONE EVALUATION 11-20 MIN: CPT | Performed by: FAMILY MEDICINE

## 2024-03-13 ASSESSMENT — ENCOUNTER SYMPTOMS
POLYDIPSIA: 0
SLEEP DISTURBANCE: 0
SPEECH DIFFICULTY: 0
COUGH: 0
PALPITATIONS: 0
DYSPHORIC MOOD: 0
STRIDOR: 0
DIARRHEA: 0
PHOTOPHOBIA: 0
CONFUSION: 0
HEADACHES: 0
HEMATURIA: 0
POLYPHAGIA: 0
COLOR CHANGE: 0
ARTHRALGIAS: 0
EYE PAIN: 0
SEIZURES: 0
ABDOMINAL DISTENTION: 0
SORE THROAT: 0
AGITATION: 0
ACTIVITY CHANGE: 0
RECTAL PAIN: 0
ABDOMINAL PAIN: 0
DIZZINESS: 0
SHORTNESS OF BREATH: 0
CONSTITUTIONAL NEGATIVE: 1
FATIGUE: 0
FLANK PAIN: 0
SINUS PRESSURE: 0
MYALGIAS: 0
BLOOD IN STOOL: 0
DYSURIA: 0
APPETITE CHANGE: 0
CONSTIPATION: 0
RHINORRHEA: 0
ADENOPATHY: 0
NERVOUS/ANXIOUS: 0
TROUBLE SWALLOWING: 0
CHEST TIGHTNESS: 0
SINUS PAIN: 0
FEVER: 0
NECK STIFFNESS: 0
DECREASED CONCENTRATION: 0

## 2024-03-13 NOTE — PATIENT INSTRUCTIONS
Follow up in 1-2 weeks after CT scan     Continue current medications and therapy for chronic medical conditions.    Patient was advised importance of proper diet/nutrition in addition adequate hydration. Patient was encouraged moderate exercise program to include 30 minutes daily for 5 days of the week or 150 minutes weekly. Patient will follow-up with us as scheduled.    Ct scan of head/sinus ordered     Blood work ordered

## 2024-03-13 NOTE — PROGRESS NOTES
Subjective   Patient ID: Therese Davison is a 73 y.o. female who presents for Vertigo.    Pt was seen last week due to sx of vertigo , pt sates she is experiencing intermittent blurry vision. Pt  was Rx   Omnicef x 10 days, Meclizine 25 mg as needed.     Pt would like to discuss a CT scan and would like to get blood work.               Review of Systems   Constitutional: Negative.  Negative for activity change, appetite change, fatigue and fever.   HENT:  Negative for congestion, dental problem, ear discharge, ear pain, mouth sores, rhinorrhea, sinus pressure, sinus pain, sore throat, tinnitus and trouble swallowing.    Eyes:  Negative for photophobia, pain and visual disturbance.   Respiratory:  Negative for cough, chest tightness, shortness of breath and stridor.    Cardiovascular:  Negative for chest pain and palpitations.   Gastrointestinal:  Negative for abdominal distention, abdominal pain, blood in stool, constipation, diarrhea and rectal pain.   Endocrine: Negative for cold intolerance, heat intolerance, polydipsia, polyphagia and polyuria.   Genitourinary:  Negative for dysuria, flank pain, hematuria and urgency.   Musculoskeletal:  Negative for arthralgias, gait problem, myalgias and neck stiffness.   Skin:  Negative for color change and rash.   Allergic/Immunologic: Negative for environmental allergies and food allergies.   Neurological:  Negative for dizziness, seizures, syncope, speech difficulty and headaches.   Hematological:  Negative for adenopathy.   Psychiatric/Behavioral:  Negative for agitation, confusion, decreased concentration, dysphoric mood and sleep disturbance. The patient is not nervous/anxious.        Objective   There were no vitals taken for this visit.    Physical Exam  Neurological:      Mental Status: She is alert and oriented to person, place, and time.   Psychiatric:         Mood and Affect: Mood normal.         Thought Content: Thought content normal.         Judgment:  Judgment normal.         Assessment/Plan   Problem List Items Addressed This Visit             ICD-10-CM    Fatigue R53.83    Relevant Orders    Comprehensive Metabolic Panel    Thyroid Stimulating Hormone    Iron and TIBC    Vitamin B12    Folate    CBC and Auto Differential    Mixed hyperlipidemia E78.2    Relevant Orders    Lipid Panel    Rhinitis, allergic J30.9    Vitamin D deficiency E55.9    Relevant Orders    Vitamin D 25-Hydroxy,Total (for eval of Vitamin D levels)    Vertigo R42    Relevant Orders    CT head wo IV contrast    CT sinus wo IV contrast     Other Visit Diagnoses         Codes    Bilateral acute serous otitis media, recurrence not specified     H65.03    Anemia, unspecified type     D64.9    Relevant Orders    Iron and TIBC    Vitamin B12    Folate    Ferritin

## 2024-03-20 ENCOUNTER — HOSPITAL ENCOUNTER (OUTPATIENT)
Dept: RADIOLOGY | Facility: CLINIC | Age: 74
Discharge: HOME | End: 2024-03-20
Payer: MEDICARE

## 2024-03-20 DIAGNOSIS — R42 VERTIGO: ICD-10-CM

## 2024-03-20 PROCEDURE — 70486 CT MAXILLOFACIAL W/O DYE: CPT

## 2024-03-20 PROCEDURE — 70450 CT HEAD/BRAIN W/O DYE: CPT | Performed by: STUDENT IN AN ORGANIZED HEALTH CARE EDUCATION/TRAINING PROGRAM

## 2024-03-20 PROCEDURE — 70486 CT MAXILLOFACIAL W/O DYE: CPT | Performed by: RADIOLOGY

## 2024-03-20 PROCEDURE — 70450 CT HEAD/BRAIN W/O DYE: CPT

## 2024-03-22 ENCOUNTER — TELEPHONE (OUTPATIENT)
Dept: PRIMARY CARE | Facility: CLINIC | Age: 74
End: 2024-03-22
Payer: MEDICARE

## 2024-03-22 DIAGNOSIS — J10.1 INFLUENZA A: Primary | ICD-10-CM

## 2024-03-22 NOTE — TELEPHONE ENCOUNTER
Pt's  called and said pt is 24 hours into the flu.   I offered walk in, he declined due to living far away.    He would like GENO to call in [employer] for her,  Ddm in [facility]

## 2024-03-23 RX ORDER — OSELTAMIVIR PHOSPHATE 75 MG/1
75 CAPSULE ORAL 2 TIMES DAILY
Qty: 10 CAPSULE | Refills: 0 | Status: SHIPPED | OUTPATIENT
Start: 2024-03-23 | End: 2024-03-28

## 2024-03-25 ENCOUNTER — PATIENT OUTREACH (OUTPATIENT)
Dept: PRIMARY CARE | Facility: CLINIC | Age: 74
End: 2024-03-25
Payer: MEDICARE

## 2024-03-25 RX ORDER — BENZONATATE 100 MG/1
100 CAPSULE ORAL EVERY 8 HOURS PRN
COMMUNITY
Start: 2024-03-24 | End: 2024-03-31

## 2024-03-25 RX ORDER — PREDNISONE 20 MG/1
TABLET ORAL
COMMUNITY
Start: 2024-03-24

## 2024-03-25 RX ORDER — DOXYCYCLINE 100 MG/1
100 CAPSULE ORAL 2 TIMES DAILY
COMMUNITY
Start: 2024-03-24 | End: 2024-03-27

## 2024-03-25 NOTE — PROGRESS NOTES
Discharge Facility: [facility]  Discharge Diagnosis: Influenza A  Admission Date: 3/22/24  Discharge Date:  3/24/24    PCP Appointment Date: TBD - task to office  Specialist Appointment Date: N/A  Hospital Encounter and Summary: Linked   See discharge assessment below for further details    Medications  Medications reviewed with patient/caregiver?: Yes (new/changes only) (3/25/2024  3:38 PM)  Is the patient having any side effects they believe may be caused by any medication additions or changes?: No (3/25/2024  3:38 PM)  Does the patient have all medications ordered at discharge?: Yes (3/25/2024  3:38 PM)  Care Management Interventions: No intervention needed (3/25/2024  3:38 PM)  Prescription Comments: START: benzonatate 100 mg capsule TID PRN x up to 7 days, doxycycline hyclate 100 mg BID x6 doses, oseltamivir 75 mg BID x6 doses, predniSONE 20 mg tablet,  2 tablets by mouth once daily for 2 doses. (3/25/2024  3:38 PM)  Is the patient taking all medications as directed (includes completed medication regime)?: Yes (3/25/2024  3:38 PM)  Care Management Interventions: Provided patient education (3/25/2024  3:38 PM)    Appointments  Does the patient have a primary care provider?: Yes (3/25/2024  3:38 PM)  Care Management Interventions: Verified appointment date/time/provider (3/25/2024  3:38 PM)  Has the patient kept scheduled appointments due by today?: Yes (3/25/2024  3:38 PM)  Care Management Interventions: Advised patient to keep appointment (3/25/2024  3:38 PM)    Self Management  Has home health visited the patient within 72 hours of discharge?: Not applicable (3/25/2024  3:38 PM)    Patient Teaching  Does the patient have access to their discharge instructions?: Yes (3/25/2024  3:38 PM)  Care Management Interventions: Reviewed instructions with patient (3/25/2024  3:38 PM)  What is the patient's perception of their health status since discharge?: Improving (3/25/2024  3:38 PM)  Is the patient/caregiver able to  teach back the hierarchy of who to call/visit for symptoms/problems? PCP, Specialist, Home Health nurse, Urgent Care, ED, 911: Yes (3/25/2024  3:38 PM)

## 2024-03-28 ENCOUNTER — OFFICE VISIT (OUTPATIENT)
Dept: DERMATOLOGY | Facility: CLINIC | Age: 74
End: 2024-03-28
Payer: MEDICARE

## 2024-03-28 DIAGNOSIS — L81.4 LENTIGO: ICD-10-CM

## 2024-03-28 DIAGNOSIS — L82.1 SEBORRHEIC KERATOSIS: ICD-10-CM

## 2024-03-28 DIAGNOSIS — L85.3 XEROSIS CUTIS: ICD-10-CM

## 2024-03-28 DIAGNOSIS — Z85.828 PERSONAL HISTORY OF SKIN CANCER: ICD-10-CM

## 2024-03-28 DIAGNOSIS — Z12.83 SKIN CANCER SCREENING: Primary | ICD-10-CM

## 2024-03-28 DIAGNOSIS — D22.9 NEVUS: ICD-10-CM

## 2024-03-28 PROCEDURE — 1036F TOBACCO NON-USER: CPT | Performed by: NURSE PRACTITIONER

## 2024-03-28 PROCEDURE — 99213 OFFICE O/P EST LOW 20 MIN: CPT | Performed by: NURSE PRACTITIONER

## 2024-03-28 PROCEDURE — 3008F BODY MASS INDEX DOCD: CPT | Performed by: NURSE PRACTITIONER

## 2024-03-28 PROCEDURE — 1159F MED LIST DOCD IN RCRD: CPT | Performed by: NURSE PRACTITIONER

## 2024-03-28 RX ORDER — AMMONIUM LACTATE 12 G/100G
LOTION TOPICAL DAILY
Qty: 225 G | Refills: 3 | Status: SHIPPED | OUTPATIENT
Start: 2024-03-28

## 2024-03-28 NOTE — PROGRESS NOTES
Subjective     Therese Davison is a 73 y.o. female who presents for the following: Skin Check.     Established patient in for skin check.     Review of Systems:  No other skin or systemic complaints other than what is documented elsewhere in the note.    The following portions of the chart were reviewed this encounter and updated as appropriate:       Skin Cancer History  BCC- mid chest-08/2021  BCC- left medial frontal scalp-2020  BCC- Right medial frontal scalp-2020  BCC- Nasal Philtrum -2020    Specialty Problems          Dermatology Problems    Melanocytic nevi of face    Basal cell carcinoma (BCC) of overlapping sites of skin     Last Assessment & Plan: Formatting of this note might be different from the original. Assessment: removed from chest, lip,forehead,monitored per PCP         Actinic keratosis    Basal cell carcinoma of skin of other part of trunk    Basal cell carcinoma of skin of other parts of face    Basal cell carcinoma of skin of scalp and neck    Hemangioma of skin and subcutaneous tissue    Melanocytic nevi of lower limb, including hip    Melanocytic nevi of other parts of face    Melanocytic nevi of trunk    Melanocytic nevi of unspecified upper limb, including shoulder    Neoplasm of uncertain behavior of skin    Other melanin hyperpigmentation    Other seborrheic keratosis    Personal history of other malignant neoplasm of skin    Scar condition and fibrosis of skin     Past Medical History:  Therese Davison  has a past medical history of Age-related osteoporosis without current pathological fracture, Body mass index (BMI) 34.0-34.9, adult (08/29/2022), Dorsalgia, unspecified, Encounter for follow-up examination after completed treatment for malignant neoplasm (06/08/2021), Encounter for general adult medical examination without abnormal findings (05/03/2022), Encounter for immunization, Encounter for screening for osteoporosis (03/25/2021), [place] (11/08/2020), Hyperlipidemia,  unspecified (03/18/2021), Impacted cerumen, bilateral (02/22/2022), Impacted cerumen, bilateral (02/22/2022), Impacted cerumen, right ear (11/09/2021), Long term (current) use of aromatase inhibitors (05/06/2022), Long term (current) use of aromatase inhibitors (06/08/2021), Mammographic calcification found on diagnostic imaging of breast (06/19/2019), Old myocardial infarction (07/10/2019), Other specified disorders of eye and adnexa (07/21/2022), Other specified postprocedural states (11/08/2020), Personal history of irradiation (11/08/2020), Personal history of malignant neoplasm of breast (05/06/2022), Personal history of other diseases of the female genital tract (11/08/2020), Personal history of other diseases of the nervous system and sense organs (11/23/2021), Personal history of other diseases of the respiratory system (10/16/2019), Personal history of other diseases of the respiratory system (10/03/2022), Personal history of other drug therapy (09/23/2021), Personal history of other specified conditions (11/08/2020), Personal history of other specified conditions (07/21/2022), Personal history of other specified conditions (10/03/2022), Personal history of other specified conditions (03/18/2021), Personal history of other specified conditions (09/10/2022), Personal history of other specified conditions (09/10/2022), Polyosteoarthritis, unspecified (03/29/2021), Unspecified chronic otitis externa, unspecified ear (02/22/2022), Unspecified chronic otitis externa, unspecified ear (02/22/2022), and Unspecified symptoms and signs involving the genitourinary system (09/08/2022).    Past Surgical History:  Therese Davison  has a past surgical history that includes Other surgical history (06/19/2019); Tonsillectomy (10/16/2019); Other surgical history (11/08/2020); Other surgical history (05/03/2022); Other surgical history (05/03/2022); Other surgical history (05/06/2022); Tubal ligation (11/08/2020); Other  surgical history (11/08/2020); and Other surgical history (11/08/2020).    Family History:  Patient family history includes Arthritis in an other family member; Cancer in her mother and another family member; Emphysema in her father; Kidney disease in her mother; environmental allergies in her mother.    Social History:  Therese Davison  reports that she has never smoked. She has never used smokeless tobacco. She reports that she does not currently use alcohol. She reports that she does not use drugs.    Allergies:  Aspirin, Dog dander, Ibuprofen, Naproxen, Other, Wool, Morphine, Naproxen sodium, and Lanolin    Current Medications / CAM's:    Current Outpatient Medications:     albuterol (ProAir RespiClick) 90 mcg/actuation aerosol powdr breath activated inhaler, Inhale., Disp: , Rfl:     albuterol 2.5 mg /3 mL (0.083 %) nebulizer solution, Take 3 mL (2.5 mg) by nebulization every 4 hours if needed., Disp: , Rfl:     ammonium lactate (Lac-Hydrin) 12 % lotion, Apply topically once daily., Disp: 225 g, Rfl: 3    anastrozole (Arimidex) 1 mg tablet, Take 1 tablet (1 mg total) by mouth once daily., Disp: , Rfl:     Bacillus coagulans (Probiotic, B. coagulans,) 1 billion cell tablet,chewable, Chew., Disp: , Rfl:     BACILLUS SUBTILIS-INULIN ORAL, Take by mouth., Disp: , Rfl:     benzonatate (Tessalon) 100 mg capsule, Take 1 capsule (100 mg) by mouth every 8 hours if needed., Disp: , Rfl:     EPINEPHrine 0.3 mg/0.3 mL injection syringe, Inject 0.3 mL intramuscularly as needed., Disp: , Rfl:     ergocalciferol, vitamin D2, (VITAMIN D2 ORAL), Take by mouth., Disp: , Rfl:     fluticasone (Flonase) 50 mcg/actuation nasal spray, Administer 1 spray into affected nostril(s) 2 times a day., Disp: , Rfl:     fluticasone propion-salmeteroL (Advair HFA) 45-21 mcg/actuation inhaler, Inhale., Disp: , Rfl:     meclizine (Antivert) 25 mg tablet, Take 1 tablet (25 mg) by mouth 3 times a day as needed for dizziness., Disp: 30 tablet,  Rfl: 0    mupirocin (Bactroban) 2 % ointment, Apply topically 2 times a day., Disp: , Rfl:     NON FORMULARY, Take by mouth once daily. DDM B12 1000 mcg tab, Disp: , Rfl:     omalizumab (Xolair) 150 mg injection, Inject under the skin., Disp: , Rfl:     omalizumab (Xolair) 75 mg/0.5 mL injection, Inject under the skin., Disp: , Rfl:     oseltamivir (Tamiflu) 75 mg capsule, Take 1 capsule (75 mg) by mouth 2 times a day for 5 days., Disp: 10 capsule, Rfl: 0    predniSONE (Deltasone) 20 mg tablet, Take 2 tablets by mouth once daily for 2 doses., Disp: , Rfl:     valACYclovir (Valtrex) 1 gram tablet, Take 2 tablets (2,000 mg) by mouth. At the onset of symptoms & repeat in 12 hours., Disp: , Rfl:      Objective   Well appearing patient in no apparent distress; mood and affect are within normal limits.    A full examination was performed including scalp, head, eyes, ears, nose, lips, neck, chest, axillae, abdomen, back, buttocks, bilateral upper extremities, bilateral lower extremities, hands, feet, fingers, toes, fingernails, and toenails. All findings within normal limits unless otherwise noted below.    Assessment/Plan   1. Skin cancer screening    The patient presented for a routine skin examination today. There are no specific concerns regarding skin health and no new or changing moles, lesions, or rashes.     Assessment: Based on the comprehensive skin examination, there were no concerning or abnormal findings. The patient's skin appeared to be in good health, without any notable dermatologic conditions or lesions.    Plan: Given the absence of any significant skin findings, no specific interventions or treatments are warranted at this time. The patient was educated on the importance of regular skin self-examinations and advised to promptly report any changes or concerns. Routine follow-up for a skin examination was recommended.    -These lesions have benign, reassuring patterns on dermoscopy.  -There were no  concerning features found on exam today.  -Recommend continued self-observation, and to contact the office if any changes in nevi are  noticed.    Discussed/information given on safe sun practices and use of sunscreen, sun protective clothing or sun avoidance. Recommend to use OTC medication of sunscreen SPF 30 or higher on a daily basis prior to sun exposure to reduce the risk of skin cancer.    Contact Office if: Any lesions change in size, shape or color; itch, bum or bleed.         Related Medications  ammonium lactate (Lac-Hydrin) 12 % lotion  Apply topically once daily.    2. Personal history of skin cancer    No evidence of recurrence in scar and benign ROS.   Continue with total body skin exams every 6 months.  ABCDEs of melanoma and atypical moles were discussed with the patient.  Patient was instructed to perform monthly self skin examination.  We recommended that the patient have regular full skin exams given an increased risk of subsequent skin cancers.  The patient was instructed to use sun protective behaviors including use of broad spectrum sunscreens and sun protective clothing to reduce risk of skin cancers.      3. Nevus  Multiple benign appearing flesh colored to pigmented macules and papules     Plan: Counseling.  I counseled the patient regarding the following:  Instructions: Monthly self-skin checks to monitor for any changes in moles are recommended. Expectations: Benign Nevi are pigmented nests of cells within the skin.No treatment is necessary. Contact Office if: Any moles change in size, shape or color; itch, bum or bleed.    4. Lentigo  Scattered tan macules in sun-exposed areas.    Solar lentigo (a type of lentigo also known as a senile lentigo, age spot, or liver spot) is a benign pigmented macule appearing on fair-skinned individuals that is related to ultraviolet radiation (UVR) exposure, typically from the sun.     PLAN:  Limiting sun exposure through avoidance, protective clothing,  "and use of sunscreens can help prevent the appearance of solar lentigines.    If lesion changes or becomes symptomatic she should return to clinic    5. Seborrheic keratosis    Seborrheic keratoses (SKs) are extremely common benign neoplasms of the skin. There can be few or hundreds of these raised, \"stuck-on\"-appearing papules and plaques with well-defined borders. The cause is unknown, although there is a familial trait for the development of multiple SKs.      SKs tend to increase in incidence and number with increasing age.     Skin Care: Seborrheic Keratoses are benign. No treatment is necessary.    Patient was instructed to call the office if any lesions become irritated or inflamed        6. Xerosis cutis  Fine, ashy, pale to white scale diffusely over skin.           "

## 2024-03-29 ENCOUNTER — OFFICE VISIT (OUTPATIENT)
Dept: PRIMARY CARE | Facility: CLINIC | Age: 74
End: 2024-03-29
Payer: MEDICARE

## 2024-03-29 VITALS
OXYGEN SATURATION: 96 % | WEIGHT: 214 LBS | SYSTOLIC BLOOD PRESSURE: 136 MMHG | HEART RATE: 73 BPM | BODY MASS INDEX: 35.65 KG/M2 | TEMPERATURE: 98.2 F | RESPIRATION RATE: 16 BRPM | DIASTOLIC BLOOD PRESSURE: 70 MMHG | HEIGHT: 65 IN

## 2024-03-29 DIAGNOSIS — J01.11 ACUTE RECURRENT FRONTAL SINUSITIS: ICD-10-CM

## 2024-03-29 DIAGNOSIS — J45.21 MILD INTERMITTENT ASTHMA WITH ACUTE EXACERBATION (HHS-HCC): Primary | ICD-10-CM

## 2024-03-29 DIAGNOSIS — J30.9 ALLERGIC RHINITIS, UNSPECIFIED SEASONALITY, UNSPECIFIED TRIGGER: ICD-10-CM

## 2024-03-29 DIAGNOSIS — Z85.3 HISTORY OF BREAST CANCER: ICD-10-CM

## 2024-03-29 PROCEDURE — 99495 TRANSJ CARE MGMT MOD F2F 14D: CPT | Performed by: FAMILY MEDICINE

## 2024-03-29 RX ORDER — AMOXICILLIN AND CLAVULANATE POTASSIUM 875; 125 MG/1; MG/1
875 TABLET, FILM COATED ORAL 2 TIMES DAILY
Qty: 20 TABLET | Refills: 0 | Status: CANCELLED | OUTPATIENT
Start: 2024-03-29 | End: 2024-04-08

## 2024-03-29 RX ORDER — DOXYCYCLINE 100 MG/1
100 CAPSULE ORAL 2 TIMES DAILY
Qty: 42 CAPSULE | Refills: 0 | Status: SHIPPED | OUTPATIENT
Start: 2024-03-29 | End: 2024-05-13 | Stop reason: WASHOUT

## 2024-03-29 RX ORDER — CYCLOSPORINE 0.5 MG/ML
1 EMULSION OPHTHALMIC 2 TIMES DAILY
COMMUNITY
Start: 2024-03-13 | End: 2025-03-13

## 2024-03-29 ASSESSMENT — ENCOUNTER SYMPTOMS
ABDOMINAL DISTENTION: 0
POLYDIPSIA: 0
ARTHRALGIAS: 0
FATIGUE: 0
CONFUSION: 0
CONSTITUTIONAL NEGATIVE: 1
DIARRHEA: 0
HEMATURIA: 0
SORE THROAT: 0
CONSTIPATION: 0
SHORTNESS OF BREATH: 0
ACTIVITY CHANGE: 0
NECK STIFFNESS: 0
HEADACHES: 0
STRIDOR: 0
DYSURIA: 0
NERVOUS/ANXIOUS: 0
SPEECH DIFFICULTY: 0
PALPITATIONS: 0
PHOTOPHOBIA: 0
ADENOPATHY: 0
DECREASED CONCENTRATION: 0
APPETITE CHANGE: 0
CHEST TIGHTNESS: 0
RHINORRHEA: 0
SEIZURES: 0
TROUBLE SWALLOWING: 0
FEVER: 0
EYE PAIN: 0
SINUS PAIN: 0
DYSPHORIC MOOD: 0
AGITATION: 0
RECTAL PAIN: 0
FLANK PAIN: 0
ABDOMINAL PAIN: 0
BLOOD IN STOOL: 0
SINUS PRESSURE: 0
DIZZINESS: 0
MYALGIAS: 0
COLOR CHANGE: 0
POLYPHAGIA: 0
SLEEP DISTURBANCE: 0

## 2024-03-29 NOTE — PROGRESS NOTES
Subjective   Patient ID: Therese Davison is a 73 y.o. female who presents for No chief complaint on file..    Patient states was seeing at [facility] on 03/22/2024 and admitted until 03/24/2024. Patient was diagnose with influenza A. Patient states still having headache, fatigue and some cough. Patient denies chest pain. Patient states done with all the medication prescribed.    Patient would like discuss test results.    Patient had done a CT head and CT sinus on 03/202024  Patient had done a XR chest on 03/22/2024    Patient denies any other symptoms or concerns today.         Review of Systems   Constitutional: Negative.  Negative for activity change, appetite change, fatigue and fever.   HENT:  Negative for congestion, dental problem, ear discharge, ear pain, mouth sores, rhinorrhea, sinus pressure, sinus pain, sore throat, tinnitus and trouble swallowing.    Eyes:  Negative for photophobia, pain and visual disturbance.   Respiratory:  Positive for cough. Negative for chest tightness, shortness of breath and stridor.    Cardiovascular:  Negative for chest pain and palpitations.   Gastrointestinal:  Negative for abdominal distention, abdominal pain, blood in stool, constipation, diarrhea and rectal pain.   Endocrine: Negative for cold intolerance, heat intolerance, polydipsia, polyphagia and polyuria.   Genitourinary:  Negative for dysuria, flank pain, hematuria and urgency.   Musculoskeletal:  Negative for arthralgias, gait problem, myalgias and neck stiffness.   Skin:  Negative for color change and rash.   Allergic/Immunologic: Negative for environmental allergies and food allergies.   Neurological:  Negative for dizziness, seizures, syncope, speech difficulty and headaches.   Hematological:  Negative for adenopathy.   Psychiatric/Behavioral:  Negative for agitation, confusion, decreased concentration, dysphoric mood and sleep disturbance. The patient is not nervous/anxious.        Objective   BP  "136/70 (BP Location: Right arm, Patient Position: Sitting, BP Cuff Size: Adult)   Pulse 73   Temp 36.8 °C (98.2 °F)   Resp 16   Ht 1.651 m (5' 5\")   Wt 97.1 kg (214 lb)   SpO2 96%   BMI 35.61 kg/m²     Physical Exam  Vitals reviewed.   Constitutional:       General: She is not in acute distress.     Appearance: She is obese. She is not ill-appearing or diaphoretic.   HENT:      Head: Normocephalic.      Right Ear: Tympanic membrane and external ear normal.      Left Ear: Tympanic membrane and external ear normal.      Nose: Nose normal. No congestion.      Mouth/Throat:      Pharynx: No posterior oropharyngeal erythema.   Eyes:      General:         Right eye: No discharge.         Left eye: No discharge.      Extraocular Movements: Extraocular movements intact.      Conjunctiva/sclera: Conjunctivae normal.      Pupils: Pupils are equal, round, and reactive to light.   Cardiovascular:      Rate and Rhythm: Normal rate and regular rhythm.      Pulses: Normal pulses.      Heart sounds: Normal heart sounds. No murmur heard.  Pulmonary:      Effort: Pulmonary effort is normal. No respiratory distress.      Breath sounds: Normal breath sounds. No wheezing or rales.   Chest:      Chest wall: No tenderness.   Abdominal:      General: Bowel sounds are normal. There is distension.      Palpations: There is no mass.      Tenderness: There is no abdominal tenderness. There is no guarding.   Musculoskeletal:         General: No tenderness. Normal range of motion.      Cervical back: Normal range of motion and neck supple. No tenderness.      Right lower leg: No edema.      Left lower leg: No edema.   Skin:     General: Skin is dry.      Coloration: Skin is not jaundiced.      Findings: No bruising, erythema or rash.   Neurological:      General: No focal deficit present.      Mental Status: She is alert and oriented to person, place, and time. Mental status is at baseline.      Cranial Nerves: No cranial nerve deficit. "      Sensory: No sensory deficit.      Coordination: Coordination normal.      Gait: Gait normal.   Psychiatric:         Mood and Affect: Mood normal.         Thought Content: Thought content normal.         Judgment: Judgment normal.         Assessment/Plan   Problem List Items Addressed This Visit             ICD-10-CM    Rhinitis, allergic J30.9    Relevant Medications    doxycycline (Vibramycin) 100 mg capsule    Other Relevant Orders    CT sinus wo IV contrast    History of breast cancer Z85.3    Mild intermittent asthma - Primary J45.20     Other Visit Diagnoses         Codes    Acute recurrent frontal sinusitis     J01.11    Relevant Medications    doxycycline (Vibramycin) 100 mg capsule    Other Relevant Orders    CT sinus wo IV contrast          Scribe Attestation  By signing my name below, I, Lonnie Medina DO , Scribe   attest that this documentation has been prepared under the direction and in the presence of Lonnie Medina DO.    Provider Attestation - Scribe documentation    All medical record entries made by the Scribe were at my direction and personally dictated by me. I have reviewed the chart and agree that the record accurately reflects my personal performance of the history, physical exam, discussion and plan.

## 2024-03-29 NOTE — PATIENT INSTRUCTIONS
Follow up in 1-2 weeks after ct scan     Continue current medications and therapy for chronic medical conditions.    Patient was advised importance of proper diet/nutrition in addition adequate hydration. Patient was encouraged moderate exercise program to include 30 minutes daily for 5 days of the week or 150 minutes weekly. Patient will follow-up with us as scheduled.    Start doxycyline 100 mg bid #42    Ct of sinus ordered     Reviewed recent hospitalization labs and diagnostic tests results    Review postobservation medications and discharge instructions

## 2024-04-05 ENCOUNTER — PATIENT OUTREACH (OUTPATIENT)
Dept: PRIMARY CARE | Facility: CLINIC | Age: 74
End: 2024-04-05
Payer: MEDICARE

## 2024-04-05 NOTE — PROGRESS NOTES
Unable to reach patient for call back after patient's follow up appointment with PCP.   PHYLLISM with call back number for patient to call if needed   If no voicemail available call attempts x 2 were made to contact the patient to assist with any questions or concerns patient may have.

## 2024-04-08 PROBLEM — R25.2 LEG CRAMPING: Status: RESOLVED | Noted: 2023-02-03 | Resolved: 2024-04-08

## 2024-04-08 ASSESSMENT — ENCOUNTER SYMPTOMS: COUGH: 1

## 2024-05-01 ENCOUNTER — HOSPITAL ENCOUNTER (OUTPATIENT)
Dept: RADIOLOGY | Facility: CLINIC | Age: 74
End: 2024-05-01
Payer: MEDICARE

## 2024-05-06 ENCOUNTER — OFFICE VISIT (OUTPATIENT)
Dept: ORTHOPEDIC SURGERY | Facility: CLINIC | Age: 74
End: 2024-05-06
Payer: MEDICARE

## 2024-05-06 DIAGNOSIS — M17.11 PRIMARY OSTEOARTHRITIS OF RIGHT KNEE: Primary | ICD-10-CM

## 2024-05-06 PROCEDURE — 2500000004 HC RX 250 GENERAL PHARMACY W/ HCPCS (ALT 636 FOR OP/ED): Performed by: ORTHOPAEDIC SURGERY

## 2024-05-06 PROCEDURE — 20610 DRAIN/INJ JOINT/BURSA W/O US: CPT | Mod: RT | Performed by: ORTHOPAEDIC SURGERY

## 2024-05-06 PROCEDURE — 3008F BODY MASS INDEX DOCD: CPT | Performed by: ORTHOPAEDIC SURGERY

## 2024-05-06 PROCEDURE — 1159F MED LIST DOCD IN RCRD: CPT | Performed by: ORTHOPAEDIC SURGERY

## 2024-05-06 PROCEDURE — 2500000005 HC RX 250 GENERAL PHARMACY W/O HCPCS: Performed by: ORTHOPAEDIC SURGERY

## 2024-05-06 PROCEDURE — 1160F RVW MEDS BY RX/DR IN RCRD: CPT | Performed by: ORTHOPAEDIC SURGERY

## 2024-05-06 RX ORDER — BETAMETHASONE SODIUM PHOSPHATE AND BETAMETHASONE ACETATE 3; 3 MG/ML; MG/ML
12 INJECTION, SUSPENSION INTRA-ARTICULAR; INTRALESIONAL; INTRAMUSCULAR; SOFT TISSUE
Status: COMPLETED | OUTPATIENT
Start: 2024-05-06 | End: 2024-05-06

## 2024-05-06 RX ORDER — LIDOCAINE HYDROCHLORIDE 10 MG/ML
5 INJECTION INFILTRATION; PERINEURAL
Status: COMPLETED | OUTPATIENT
Start: 2024-05-06 | End: 2024-05-06

## 2024-05-06 RX ADMIN — BETAMETHASONE SODIUM PHOSPHATE AND BETAMETHASONE ACETATE 12 MG: 3; 3 INJECTION, SUSPENSION INTRA-ARTICULAR; INTRALESIONAL; INTRAMUSCULAR at 14:27

## 2024-05-06 RX ADMIN — LIDOCAINE HYDROCHLORIDE 5 ML: 10 INJECTION, SOLUTION INFILTRATION; PERINEURAL at 14:27

## 2024-05-06 NOTE — PROGRESS NOTES
Therese is here for a right knee cortisone injection.  She usually gets about 3 months of relief.    L Inj/Asp: R knee on 5/6/2024 2:27 PM  Indications: pain  Details: 22 G needle, lateral approach  Medications: 12 mg betamethasone acet,sod phos 6 mg/mL; 5 mL lidocaine 10 mg/mL (1 %)  Outcome: tolerated well, no immediate complications  Procedure, treatment alternatives, risks and benefits explained, specific risks discussed. Consent was given by the patient. Immediately prior to procedure a time out was called to verify the correct patient, procedure, equipment, support staff and site/side marked as required. Patient was prepped and draped in the usual sterile fashion.         She will ice and work on range of motion and exercises as directed.    She will follow up on an as-needed basis.

## 2024-05-08 ENCOUNTER — PATIENT OUTREACH (OUTPATIENT)
Dept: PRIMARY CARE | Facility: CLINIC | Age: 74
End: 2024-05-08
Payer: MEDICARE

## 2024-05-08 NOTE — PROGRESS NOTES
Successful outreach to patient regarding hospitalization as patient continues TCM program.   At time of outreach call the patient feels as if their condition has improved since initial visit with PCP or specialist.  Questions or concerns addressed at this time with patient.   Provided contact information to patient if any further non-emergent needs arise.

## 2024-05-11 NOTE — PROGRESS NOTES
Therese Davison female   1950 73 y.o.   [id number]           [facility]  Therese Davison is a 73 y.o.  female diagnosed 2019 with screen detected left breast ductal carcinoma in situ (DCIS), high grade with necrosis. 3.6 cm, ER >95%, OH>95%. 2019 Ashley Navarro performed left breast Magseed localized partial mastectomy with several positive and/or close margins, pTis. 2019 she underwent left breast re-excision partial mastectomy with negative margins (close anterior margin less than 1cm focally). All other margins were greater than or equal to 2 mm. Her case was presented at multidisciplinary breast cancer conference, no further surgery was recommended. She completed adjuvant radiation and started anastrozole 2019, was off for a short time and restarted in 2020, currently taking and tolerating well. Stage pTis. She has family history of breast cancer in her mother age 68 with recurrence and paternal aunt with breast cancer.     She had laparoscopic BSO 2020 for benign cysts.    BREAST IMAGIN2023 bilateral diagnostic mammogram, BI-RADS Category 2. No breast MRI performed.     REPRODUCTIVE HISTORY: menarche age 16, , fertility drug, first birth age 33, did not breastfeed, no OCPs, menopause age 58, no HRT, scattered fibroglandular tissue.     FAMILY CANCER HISTORY:   Mother: Breast cancer age 60s, contralateral breast cancer age 70s  Paternal Aunt: Breast cancer       REVIEW OF SYSTEMS    Constitutional:  Negative for appetite change, fatigue, fever and unexpected weight change.   HENT:  Negative for ear pain, hearing loss, nosebleeds, sore throat and trouble swallowing.    Eyes:  Negative for discharge, itching and visual disturbance.   Respiratory:  Negative for cough, chest tightness and shortness of breath.    Cardiovascular:  Negative for chest pain, palpitations and leg swelling.   Breast: as indicated in HPI  Gastrointestinal:  Negative for abdominal pain,  constipation, diarrhea and nausea.   Endocrine: Negative for cold intolerance and heat intolerance.   Genitourinary:  Negative for dysuria, frequency, hematuria, pelvic pain and vaginal bleeding.   Musculoskeletal:  Negative for arthralgias, back pain, gait problem, joint swelling and myalgias.   Skin:  Negative for color change and rash.   Allergic/Immunologic: Negative for environmental allergies and food allergies.   Neurological:  Negative for dizziness, tremors, speech difficulty, weakness, numbness and headaches.   Hematological:  Does not bruise/bleed easily.   Psychiatric/Behavioral:  Negative for agitation, dysphoric mood and sleep disturbance. The patient is not nervous/anxious.         MEDICATIONS  Current Outpatient Medications   Medication Instructions    albuterol (ProAir RespiClick) 90 mcg/actuation aerosol powdr breath activated inhaler inhalation    albuterol 2.5 mg, nebulization, Every 4 hours PRN    ammonium lactate (Lac-Hydrin) 12 % lotion Topical, Daily    anastrozole (ARIMIDEX) 1 mg, oral, Daily    Bacillus coagulans (Probiotic, B. coagulans,) 1 billion cell tablet,chewable oral    BACILLUS SUBTILIS-INULIN ORAL oral    cycloSPORINE (Restasis) 0.05 % ophthalmic emulsion 1 drop, ophthalmic (eye), 2 times daily    EPINEPHrine 0.3 mg/0.3 mL injection syringe Inject 0.3 mL intramuscularly as needed.    ergocalciferol, vitamin D2, (VITAMIN D2 ORAL) oral    fluticasone (Flonase) 50 mcg/actuation nasal spray 1 spray, nasal, 2 times daily    fluticasone propion-salmeteroL (Advair HFA) 45-21 mcg/actuation inhaler inhalation    meclizine (ANTIVERT) 25 mg, oral, 3 times daily PRN    mupirocin (Bactroban) 2 % ointment Topical, 2 times daily    NON FORMULARY oral, Daily, DDM B12 1000 mcg tab    omalizumab (Xolair) 150 mg injection subcutaneous    omalizumab (Xolair) 75 mg/0.5 mL injection subcutaneous    predniSONE (Deltasone) 20 mg tablet Take 2 tablets by mouth once daily for 2 doses.    valACYclovir  (VALTREX) 2,000 mg, oral, At the onset of symptoms & repeat in 12 hours.        ALLERGIES  Allergies   Allergen Reactions    Aspirin Shortness of breath    Dog Dander Shortness of breath    Ibuprofen Anaphylaxis and Other     Causes asthma attacks    Naproxen Shortness of breath     Causes asthma attacks    Other Shortness of breath    Wool Rash    Morphine Unknown and Rash    Naproxen Sodium Unknown     Causes asthma attacks    Lanolin Rash        SOCIAL HISTORY    Family History   Problem Relation Name Age of Onset    Breast cancer Mother  68    Kidney disease Mother          renal failure    Cancer Mother          breast    Other (environmental allergies) Mother      Emphysema Father      Breast cancer Sister  64    Breast cancer Other paternal aunt 68    Cancer Other paternal aunt         breast    Arthritis Other grandparent          Social History     Tobacco Use    Smoking status: Never    Smokeless tobacco: Never   Substance Use Topics    Alcohol use: Not Currently        VITALS  Vitals:    05/13/24 1220   BP: 116/79   Pulse: 67        PHYSICAL EXAM  Patient is alert and oriented x 3, with appropriate mood. Her gait is steady and hand grasps are equal. Sclera clear. The breasts are asymmetrical, right breast is larger. The tissue is soft without palpable abnormalities, discrete nodules or masses. Right breast skin and nipple appear normal.The left breast with healed partial circumareolar incision. Left nipple areolar complex hyperpigmented from radiation.There is no cervical, supraclavicular, or axillary lymphadenopathy palpable. Heart rate and rhythm normal, S1 and S2 appreciated. The lungs are clear bilaterally. Abdomen is soft and non-tender.      Physical Exam  Chest:              IMAGING  Bilateral screening mammogram today, BI-RADS Category 2.    Time was spent viewing digital images of the radiology testing with the patient. I explained the results in depth, along with suggested explanation for follow  up recommendations based on the testing results.          ORDERS  Orders Placed This Encounter   Procedures    BI mammo bilateral screening tomosynthesis     Standing Status:   Future     Standing Expiration Date:   7/11/2025     Order Specific Question:   Perform a breast ultrasound if clinically indicated by Radiologist?     Answer:   Yes     Order Specific Question:   Previous Mamm performed at  location?     Answer:   Yes     Order Specific Question:   Reason for exam:     Answer:   clinic screen     Order Specific Question:   Radiologist to Determine Optimal Study     Answer:   Yes     Order Specific Question:   Release result to [facility]     Answer:   Immediate     Order Specific Question:   Is this exam part of a Research Study? If Yes, link this order to the research study     Answer:   No    BI mammo bilateral screening tomosynthesis     Standing Status:   Future     Number of Occurrences:   1     Standing Expiration Date:   7/13/2025     Order Specific Question:   Perform a breast ultrasound if clinically indicated by Radiologist?     Answer:   Yes     Order Specific Question:   Previous Mamm performed at  location?     Answer:   Yes     Order Specific Question:   Reason for exam:     Answer:   clinic screening     Order Specific Question:   Radiologist to Determine Optimal Study     Answer:   Yes     Order Specific Question:   Release result to [facility]     Answer:   Immediate     Order Specific Question:   Is this exam part of a Research Study? If Yes, link this order to the research study     Answer:   No           ASSESSMENT/PLAN  1. Encounter for follow-up surveillance of breast cancer  BI mammo bilateral screening tomosynthesis    BI mammo bilateral screening tomosynthesis    anastrozole (Arimidex) 1 mg tablet    CANCELED: Clinic Appointment Request      2. Encounter for monitoring anastrozole therapy             Follow up Return to PCP. Stop anastrozole 1mg on 1/1/2025  Thank you for coming to see  me today at [facility]. Your clinical examination and imaging is normal. You no longer need to be seen by a surgical breast specialist. It is important to continue annual screening mammograms & clinical breast exams. Please return to see me if you have a new breast problem or abnormal mammogram. It has been a pleasure having you as a patient.     You can see your health information, review clinical summaries from office visits & test results online when you follow your health with MY  Chart, a personal health record. To sign up go to [email or web address]. If you need assistance with signing up or trouble getting into your account call [facility] Patient Line 24/7 at [phone].     My office phone number is [phone] if you need to get in touch with me or have additional questions or problems. Thank you for choosing [facility] and trusting me as your healthcare provider. I am honored to be a provider on your health care team and I remain dedicated to helping you achieve your health goals.       Fide Rizvi, REBEKAH-CNP  [facility] [place]

## 2024-05-13 ENCOUNTER — HOSPITAL ENCOUNTER (OUTPATIENT)
Dept: RADIOLOGY | Facility: CLINIC | Age: 74
Discharge: HOME | End: 2024-05-13
Payer: MEDICARE

## 2024-05-13 ENCOUNTER — APPOINTMENT (OUTPATIENT)
Dept: RADIOLOGY | Facility: CLINIC | Age: 74
End: 2024-05-13
Payer: MEDICARE

## 2024-05-13 ENCOUNTER — OFFICE VISIT (OUTPATIENT)
Dept: SURGICAL ONCOLOGY | Facility: CLINIC | Age: 74
End: 2024-05-13
Payer: MEDICARE

## 2024-05-13 VITALS
SYSTOLIC BLOOD PRESSURE: 116 MMHG | DIASTOLIC BLOOD PRESSURE: 79 MMHG | HEIGHT: 65 IN | WEIGHT: 219 LBS | BODY MASS INDEX: 36.49 KG/M2 | HEART RATE: 67 BPM

## 2024-05-13 VITALS — WEIGHT: 211 LBS | HEIGHT: 65 IN | BODY MASS INDEX: 35.16 KG/M2

## 2024-05-13 DIAGNOSIS — Z51.81 ENCOUNTER FOR MONITORING ANASTROZOLE THERAPY: ICD-10-CM

## 2024-05-13 DIAGNOSIS — Z85.3 ENCOUNTER FOR FOLLOW-UP SURVEILLANCE OF BREAST CANCER: ICD-10-CM

## 2024-05-13 DIAGNOSIS — Z85.3 ENCOUNTER FOR FOLLOW-UP SURVEILLANCE OF BREAST CANCER: Primary | ICD-10-CM

## 2024-05-13 DIAGNOSIS — R92.8 OTHER ABNORMAL AND INCONCLUSIVE FINDINGS ON DIAGNOSTIC IMAGING OF BREAST: ICD-10-CM

## 2024-05-13 DIAGNOSIS — Z08 ENCOUNTER FOR FOLLOW-UP SURVEILLANCE OF BREAST CANCER: Primary | ICD-10-CM

## 2024-05-13 DIAGNOSIS — Z79.811 ENCOUNTER FOR MONITORING ANASTROZOLE THERAPY: ICD-10-CM

## 2024-05-13 DIAGNOSIS — Z08 ENCOUNTER FOR FOLLOW-UP SURVEILLANCE OF BREAST CANCER: ICD-10-CM

## 2024-05-13 PROCEDURE — 77067 SCR MAMMO BI INCL CAD: CPT

## 2024-05-13 PROCEDURE — 1160F RVW MEDS BY RX/DR IN RCRD: CPT | Performed by: NURSE PRACTITIONER

## 2024-05-13 PROCEDURE — 77067 SCR MAMMO BI INCL CAD: CPT | Performed by: STUDENT IN AN ORGANIZED HEALTH CARE EDUCATION/TRAINING PROGRAM

## 2024-05-13 PROCEDURE — 1159F MED LIST DOCD IN RCRD: CPT | Performed by: NURSE PRACTITIONER

## 2024-05-13 PROCEDURE — 99214 OFFICE O/P EST MOD 30 MIN: CPT | Performed by: NURSE PRACTITIONER

## 2024-05-13 PROCEDURE — 77063 BREAST TOMOSYNTHESIS BI: CPT | Performed by: STUDENT IN AN ORGANIZED HEALTH CARE EDUCATION/TRAINING PROGRAM

## 2024-05-13 PROCEDURE — 1036F TOBACCO NON-USER: CPT | Performed by: NURSE PRACTITIONER

## 2024-05-13 PROCEDURE — 1126F AMNT PAIN NOTED NONE PRSNT: CPT | Performed by: NURSE PRACTITIONER

## 2024-05-13 PROCEDURE — 3008F BODY MASS INDEX DOCD: CPT | Performed by: NURSE PRACTITIONER

## 2024-05-13 RX ORDER — ANASTROZOLE 1 MG/1
1 TABLET ORAL DAILY
Qty: 90 TABLET | Refills: 2 | Status: SHIPPED | OUTPATIENT
Start: 2024-05-13

## 2024-05-13 ASSESSMENT — PAIN SCALES - GENERAL: PAINLEVEL: 0-NO PAIN

## 2024-06-06 DIAGNOSIS — J41.0 SIMPLE CHRONIC BRONCHITIS (MULTI): Primary | ICD-10-CM

## 2024-06-06 NOTE — PROGRESS NOTES
Patient identified as part of population health initiative to improve control of COPD across primary care practices. Referral placed to see practice pharmacist for COPD management review.

## 2024-06-13 ENCOUNTER — PATIENT OUTREACH (OUTPATIENT)
Dept: PRIMARY CARE | Facility: CLINIC | Age: 74
End: 2024-06-13
Payer: MEDICARE

## 2024-06-13 RX ORDER — METOPROLOL SUCCINATE 25 MG/1
25 TABLET, EXTENDED RELEASE ORAL
COMMUNITY
Start: 2024-06-03 | End: 2025-06-03

## 2024-06-13 NOTE — PROGRESS NOTES
Call placed regarding 90 days post discharge follow up call.  At time of outreach call the patient feels as if their condition has improved since initial visit with PCP or specialist. States she was in the ED a few weeks ago for afib and she had follow with Dr. Montelongo and he added eliquis and metoprolol to her medications.  Questions or concerns regarding recovery period addressed at this time.  Reviewed any PCP or specialists progress notes/labs/radiology reports if applicable and addressed any questions or concerns.

## 2024-06-21 ENCOUNTER — HOSPITAL ENCOUNTER (OUTPATIENT)
Dept: RADIOLOGY | Facility: CLINIC | Age: 74
Discharge: HOME | End: 2024-06-21
Payer: MEDICARE

## 2024-06-21 DIAGNOSIS — J01.11 ACUTE RECURRENT FRONTAL SINUSITIS: ICD-10-CM

## 2024-06-21 DIAGNOSIS — J30.9 ALLERGIC RHINITIS, UNSPECIFIED SEASONALITY, UNSPECIFIED TRIGGER: ICD-10-CM

## 2024-06-21 PROCEDURE — 70486 CT MAXILLOFACIAL W/O DYE: CPT

## 2024-06-28 ENCOUNTER — APPOINTMENT (OUTPATIENT)
Dept: PRIMARY CARE | Facility: CLINIC | Age: 74
End: 2024-06-28
Payer: MEDICARE

## 2024-06-28 VITALS
TEMPERATURE: 97.3 F | HEIGHT: 65 IN | BODY MASS INDEX: 36.32 KG/M2 | HEART RATE: 74 BPM | WEIGHT: 218 LBS | SYSTOLIC BLOOD PRESSURE: 122 MMHG | DIASTOLIC BLOOD PRESSURE: 80 MMHG | RESPIRATION RATE: 18 BRPM | OXYGEN SATURATION: 98 %

## 2024-06-28 DIAGNOSIS — R73.9 HYPERGLYCEMIA: ICD-10-CM

## 2024-06-28 DIAGNOSIS — J30.1 SEASONAL ALLERGIC RHINITIS DUE TO POLLEN: ICD-10-CM

## 2024-06-28 DIAGNOSIS — G47.30 SLEEP APNEA, UNSPECIFIED TYPE: ICD-10-CM

## 2024-06-28 DIAGNOSIS — J32.4 PANSINUSITIS, UNSPECIFIED CHRONICITY: ICD-10-CM

## 2024-06-28 DIAGNOSIS — I48.0 PAROXYSMAL ATRIAL FIBRILLATION (MULTI): Primary | ICD-10-CM

## 2024-06-28 DIAGNOSIS — E66.09 EXOGENOUS OBESITY: ICD-10-CM

## 2024-06-28 LAB
POC FINGERSTICK BLOOD GLUCOSE: 92 MG/DL (ref 70–100)
POC HEMOGLOBIN A1C: 6 % (ref 4.2–6.5)

## 2024-06-28 PROCEDURE — 83036 HEMOGLOBIN GLYCOSYLATED A1C: CPT | Performed by: FAMILY MEDICINE

## 2024-06-28 PROCEDURE — 82962 GLUCOSE BLOOD TEST: CPT | Performed by: FAMILY MEDICINE

## 2024-06-28 PROCEDURE — 99214 OFFICE O/P EST MOD 30 MIN: CPT | Performed by: FAMILY MEDICINE

## 2024-06-28 PROCEDURE — 96372 THER/PROPH/DIAG INJ SC/IM: CPT | Performed by: FAMILY MEDICINE

## 2024-06-28 RX ORDER — TRIAMCINOLONE ACETONIDE 40 MG/ML
80 INJECTION, SUSPENSION INTRA-ARTICULAR; INTRAMUSCULAR ONCE
Status: COMPLETED | OUTPATIENT
Start: 2024-06-28 | End: 2024-06-28

## 2024-06-28 RX ADMIN — TRIAMCINOLONE ACETONIDE 80 MG: 40 INJECTION, SUSPENSION INTRA-ARTICULAR; INTRAMUSCULAR at 16:19

## 2024-06-28 ASSESSMENT — ENCOUNTER SYMPTOMS
BLOOD IN STOOL: 0
SLEEP DISTURBANCE: 0
CONSTITUTIONAL NEGATIVE: 1
TROUBLE SWALLOWING: 0
ABDOMINAL DISTENTION: 0
POLYPHAGIA: 0
ADENOPATHY: 0
NERVOUS/ANXIOUS: 0
STRIDOR: 0
EYE PAIN: 0
CONFUSION: 0
HEADACHES: 0
NECK STIFFNESS: 0
PALPITATIONS: 0
FLANK PAIN: 0
DIZZINESS: 0
SINUS PRESSURE: 0
MYALGIAS: 0
HEMATURIA: 0
ACTIVITY CHANGE: 0
RECTAL PAIN: 0
POLYDIPSIA: 0
FATIGUE: 0
SEIZURES: 0
PHOTOPHOBIA: 0
CONSTIPATION: 0
DYSURIA: 0
SINUS PAIN: 0
SORE THROAT: 0
APPETITE CHANGE: 0
DYSPHORIC MOOD: 0
RHINORRHEA: 0
AGITATION: 0
ABDOMINAL PAIN: 0
CHEST TIGHTNESS: 0
FEVER: 0
SPEECH DIFFICULTY: 0
DECREASED CONCENTRATION: 0
COUGH: 0
DIARRHEA: 0
COLOR CHANGE: 0

## 2024-06-28 NOTE — PATIENT INSTRUCTIONS
Follow up in 3 months    Continue current medications and therapy for chronic medical conditions.    Patient was advised importance of proper diet/nutrition in addition adequate hydration. Patient was encouraged moderate exercise program to include 30 minutes daily for 5 days of the week or 150 minutes weekly. Patient will follow-up with us as scheduled.    IO A1C and glucose done today    Home sleep study ordered     Referral to ENT    Kenalog IM administered today    Schedule for HSAT

## 2024-06-28 NOTE — PROGRESS NOTES
Subjective   Patient ID: Therese Davison is a 73 y.o. female who presents for ER Follow-up.    Patient states was seeing at [facility] on 06/01/2024. Patient was diagnose with Atrial fibrillation with RVR (HCC) - Primary   Atrial fibrillation. Patient states was with chest pain and palpitation. Patient states are talking eliquis and metropolol.    Patient would like discuss a back pain that happens 2 weeks ago and then radiate in her chest.    Patient denies any other symptoms or concerns today.      ER Follow-up  This is a recurrent problem. The current episode started 1 to 4 weeks ago. The problem occurs rarely. The problem has been gradually improving. Associated symptoms include arthralgias. Pertinent negatives include no abdominal pain, chest pain, congestion, coughing, fatigue, fever, headaches, myalgias, rash or sore throat. Nothing aggravates the symptoms. She has tried oral narcotics for the symptoms. The treatment provided moderate relief.        Review of Systems   Constitutional: Negative.  Negative for activity change, appetite change, fatigue and fever.   HENT:  Negative for congestion, dental problem, ear discharge, ear pain, mouth sores, rhinorrhea, sinus pressure, sinus pain, sore throat, tinnitus and trouble swallowing.    Eyes:  Negative for photophobia, pain and visual disturbance.   Respiratory:  Positive for shortness of breath. Negative for cough, chest tightness and stridor.    Cardiovascular:  Negative for chest pain and palpitations.   Gastrointestinal:  Negative for abdominal distention, abdominal pain, blood in stool, constipation, diarrhea and rectal pain.   Endocrine: Negative for cold intolerance, heat intolerance, polydipsia, polyphagia and polyuria.   Genitourinary:  Negative for dysuria, flank pain, hematuria and urgency.   Musculoskeletal:  Positive for arthralgias. Negative for gait problem, myalgias and neck stiffness.   Skin:  Negative for color change and rash.  "  Allergic/Immunologic: Positive for environmental allergies. Negative for food allergies.   Neurological:  Negative for dizziness, seizures, syncope, speech difficulty and headaches.   Hematological:  Negative for adenopathy.   Psychiatric/Behavioral:  Negative for agitation, confusion, decreased concentration, dysphoric mood and sleep disturbance. The patient is not nervous/anxious.        Objective   /80 (BP Location: Right arm, Patient Position: Sitting, BP Cuff Size: Large adult)   Pulse 74   Temp 36.3 °C (97.3 °F)   Resp 18   Ht 1.651 m (5' 5\")   Wt 98.9 kg (218 lb)   SpO2 98%   BMI 36.28 kg/m²     Physical Exam  Vitals reviewed.   Constitutional:       General: She is not in acute distress.     Appearance: She is obese. She is not ill-appearing or diaphoretic.   HENT:      Head: Normocephalic.      Right Ear: Tympanic membrane and external ear normal.      Left Ear: Tympanic membrane and external ear normal.      Nose: Nose normal. No congestion.      Mouth/Throat:      Pharynx: No posterior oropharyngeal erythema.   Eyes:      General:         Right eye: No discharge.         Left eye: No discharge.      Extraocular Movements: Extraocular movements intact.      Conjunctiva/sclera: Conjunctivae normal.      Pupils: Pupils are equal, round, and reactive to light.   Cardiovascular:      Rate and Rhythm: Normal rate. Rhythm irregular.      Pulses: Normal pulses.      Heart sounds: Normal heart sounds. No murmur heard.  Pulmonary:      Effort: Pulmonary effort is normal. No respiratory distress.      Breath sounds: Normal breath sounds. No wheezing or rales.   Chest:      Chest wall: No tenderness.   Abdominal:      General: Bowel sounds are normal. There is distension.      Palpations: There is no mass.      Tenderness: There is no abdominal tenderness. There is no guarding.   Musculoskeletal:         General: No tenderness. Normal range of motion.      Cervical back: Normal range of motion and neck " supple. No tenderness.      Right lower leg: No edema.      Left lower leg: No edema.   Skin:     General: Skin is dry.      Coloration: Skin is not jaundiced.      Findings: No bruising, erythema or rash.   Neurological:      General: No focal deficit present.      Mental Status: She is alert and oriented to person, place, and time. Mental status is at baseline.      Cranial Nerves: No cranial nerve deficit.      Sensory: Sensory deficit present.      Coordination: Coordination abnormal.      Gait: Gait normal.   Psychiatric:         Mood and Affect: Mood normal.         Thought Content: Thought content normal.         Judgment: Judgment normal.         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    Provider Attestation - Scribe documentation    All medical record entries made by the Scribe were at my direction and personally dictated by me. I have reviewed the chart and agree that the record accurately reflects my personal performance of the history, physical exam, discussion and plan.

## 2024-06-30 ASSESSMENT — ENCOUNTER SYMPTOMS
SHORTNESS OF BREATH: 1
ARTHRALGIAS: 1

## 2024-07-22 ENCOUNTER — APPOINTMENT (OUTPATIENT)
Dept: OTOLARYNGOLOGY | Facility: CLINIC | Age: 74
End: 2024-07-22
Payer: MEDICARE

## 2024-07-22 VITALS
BODY MASS INDEX: 36.28 KG/M2 | DIASTOLIC BLOOD PRESSURE: 69 MMHG | HEIGHT: 65 IN | SYSTOLIC BLOOD PRESSURE: 122 MMHG | TEMPERATURE: 97.2 F

## 2024-07-22 DIAGNOSIS — J32.2 CHRONIC ETHMOIDAL SINUSITIS: ICD-10-CM

## 2024-07-22 DIAGNOSIS — J30.9 CHRONIC ALLERGIC RHINITIS: ICD-10-CM

## 2024-07-22 DIAGNOSIS — J32.0 CHRONIC MAXILLARY SINUSITIS: Primary | ICD-10-CM

## 2024-07-22 PROCEDURE — 1159F MED LIST DOCD IN RCRD: CPT | Performed by: OTOLARYNGOLOGY

## 2024-07-22 PROCEDURE — 99214 OFFICE O/P EST MOD 30 MIN: CPT | Performed by: OTOLARYNGOLOGY

## 2024-07-22 PROCEDURE — 31231 NASAL ENDOSCOPY DX: CPT | Performed by: OTOLARYNGOLOGY

## 2024-07-22 PROCEDURE — 1160F RVW MEDS BY RX/DR IN RCRD: CPT | Performed by: OTOLARYNGOLOGY

## 2024-07-22 PROCEDURE — 1036F TOBACCO NON-USER: CPT | Performed by: OTOLARYNGOLOGY

## 2024-07-22 NOTE — PROGRESS NOTES
Impression:  1. Chronic maxillary sinusitis        2. Chronic ethmoidal sinusitis        3. Chronic allergic rhinitis             RECOMMENDATIONS/PLAN :  I reassured the patient there is no evidence of any intranasal polyps or residual infection today.  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  I want her to start flushing her nose and sinuses with a sinus rinse kit on a daily basis.  She must continue her Flonase nasal spray-2 puffs each nostril daily and she will continue her Claritin on a daily basis as well.  If she starts developing 5 or 6 sinus infections per year, we would then consider possible endoscopic sinus surgery.  She will call and let me know how she is doing over the next several months.      **This electronic medical record note was created with the use of voice recognition software.  Despite proofreading, typographical or grammatical errors may be present that could affect meaning of content **    Subjective   Patient ID:     Therese Davison is a 73 y.o. female who presents to the office today with a history of allergies and asthma.  She states that she gets 1 or 2 sinus infections per year.  She did have a CT scan done in June that revealed chronic changes involving her sinuses.  Currently she denies any fever chills or pus draining from the sinuses.  She is not rinsing her sinuses however she is using her Flonase nasal spray on a regular basis.    ROS:  A detailed 12 system review of systems is noted on the intake form has been reviewed with the patient with details noted in the HPI and scanned into the patient's medical record.    Objective     Past Medical History:   Diagnosis Date    Age-related osteoporosis without current  pathological fracture     At risk of fracture due to osteoporosis    Body mass index (BMI) 34.0-34.9, adult 08/29/2022    BMI 34.0-34.9,adult    Dorsalgia, unspecified     Right-sided back pain    Encounter for follow-up examination after completed treatment for malignant neoplasm 06/08/2021    Encounter for follow-up surveillance of ductal carcinoma in situ (DCIS) of breast    Encounter for general adult medical examination without abnormal findings 05/03/2022    Encounter for Medicare annual wellness exam    Encounter for immunization     Encounter for immunization    Encounter for screening for osteoporosis 03/25/2021    Encounter for imaging to assess osteoporosis    Flushing 11/08/2020    Hot flashes not due to menopause    Hyperlipidemia, unspecified 03/18/2021    Dyslipidemia    Impacted cerumen, bilateral 02/22/2022    Hearing loss due to cerumen impaction, bilateral    Impacted cerumen, bilateral 02/22/2022    Hearing loss of both ears due to cerumen impaction    Impacted cerumen, right ear 11/09/2021    Hearing loss of right ear due to cerumen impaction    Long term (current) use of aromatase inhibitors 05/06/2022    Use of anastrozole (Arimidex)    Long term (current) use of aromatase inhibitors 06/08/2021    Use of anastrozole (Arimidex)    Mammographic calcification found on diagnostic imaging of breast 06/19/2019    Breast calcifications on mammogram    Old myocardial infarction 07/10/2019    History of non-ST elevation myocardial infarction (NSTEMI)    Other specified disorders of eye and adnexa 07/21/2022    Irritation of right eye    Other specified postprocedural states 11/08/2020    S/P lumpectomy, left breast    Personal history of irradiation 11/08/2020    S/P radiation therapy    Personal history of malignant neoplasm of breast 05/06/2022    History of breast cancer    Personal history of other diseases of the female genital tract 11/08/2020    Personal history of ovarian cyst    Personal  history of other diseases of the nervous system and sense organs 11/23/2021    History of acute otitis externa    Personal history of other diseases of the respiratory system 10/16/2019    History of bronchitis    Personal history of other diseases of the respiratory system 10/03/2022    History of paranasal sinus congestion    Personal history of other drug therapy 09/23/2021    History of influenza vaccination    Personal history of other specified conditions 11/08/2020    History of abnormal mammogram    Personal history of other specified conditions 07/21/2022    History of diarrhea    Personal history of other specified conditions 10/03/2022    History of fatigue    Personal history of other specified conditions 03/18/2021    History of hoarseness    Personal history of other specified conditions 09/10/2022    History of urinary urgency    Personal history of other specified conditions 09/10/2022    History of urinary frequency    Polyosteoarthritis, unspecified 03/29/2021    Osteoarthritis, multiple sites    Unspecified chronic otitis externa, unspecified ear 02/22/2022    Chronic otitis externa    Unspecified chronic otitis externa, unspecified ear 02/22/2022    Chronic otitis externa    Unspecified symptoms and signs involving the genitourinary system 09/08/2022    UTI symptoms        Past Surgical History:   Procedure Laterality Date    BREAST LUMPECTOMY Left 2019    OTHER SURGICAL HISTORY  06/19/2019    Dental Surgery    OTHER SURGICAL HISTORY  11/08/2020    Rotator cuff repair    OTHER SURGICAL HISTORY  05/03/2022    Cataract surgery    OTHER SURGICAL HISTORY  05/03/2022    Ovarian cystectomy    OTHER SURGICAL HISTORY  05/06/2022    Salpingo-oophorectomy bilateral    OTHER SURGICAL HISTORY  11/08/2020    Breast biopsy core needle    OTHER SURGICAL HISTORY  11/08/2020    Lumpectomy    TONSILLECTOMY  10/16/2019    Tonsillectomy    TUBAL LIGATION  11/08/2020    Tubal Ligation        Allergies   Allergen  Reactions    Aspirin Shortness of breath    Dog Dander Shortness of breath    Ibuprofen Anaphylaxis and Other     Causes asthma attacks    Naproxen Shortness of breath     Causes asthma attacks    Other Shortness of breath    Wool Rash    Morphine Unknown and Rash    Naproxen Sodium Unknown     Causes asthma attacks    Lanolin Rash          Current Outpatient Medications:     anastrozole (Arimidex) 1 mg tablet, Take 1 tablet (1 mg total) by mouth once daily., Disp: 90 tablet, Rfl: 2    apixaban (Eliquis) 5 mg tablet, Take 1 tablet (5 mg) by mouth twice a day., Disp: , Rfl:     Bacillus coagulans (Probiotic, B. coagulans,) 1 billion cell tablet,chewable, Chew., Disp: , Rfl:     BACILLUS SUBTILIS-INULIN ORAL, Take by mouth., Disp: , Rfl:     cycloSPORINE (Restasis) 0.05 % ophthalmic emulsion, Administer 1 drop into affected eye(s) twice a day., Disp: , Rfl:     ergocalciferol, vitamin D2, (VITAMIN D2 ORAL), Take by mouth., Disp: , Rfl:     fluticasone (Flonase) 50 mcg/actuation nasal spray, Administer 1 spray into affected nostril(s) 2 times a day., Disp: , Rfl:     fluticasone propion-salmeteroL (Advair HFA) 45-21 mcg/actuation inhaler, Inhale., Disp: , Rfl:     metoprolol succinate XL (Toprol-XL) 25 mg 24 hr tablet, Take 1 tablet (25 mg) by mouth once daily., Disp: , Rfl:     omalizumab (Xolair) 150 mg injection, Inject under the skin., Disp: , Rfl:     omalizumab (Xolair) 75 mg/0.5 mL injection, Inject under the skin., Disp: , Rfl:     albuterol (ProAir RespiClick) 90 mcg/actuation aerosol powdr breath activated inhaler, Inhale., Disp: , Rfl:     albuterol 2.5 mg /3 mL (0.083 %) nebulizer solution, Take 3 mL (2.5 mg) by nebulization every 4 hours if needed., Disp: , Rfl:     ammonium lactate (Lac-Hydrin) 12 % lotion, Apply topically once daily., Disp: 225 g, Rfl: 3    EPINEPHrine 0.3 mg/0.3 mL injection syringe, Inject 0.3 mL intramuscularly as needed., Disp: , Rfl:     meclizine (Antivert) 25 mg tablet, Take 1  "tablet (25 mg) by mouth 3 times a day as needed for dizziness. (Patient not taking: Reported on 7/22/2024), Disp: 30 tablet, Rfl: 0    mupirocin (Bactroban) 2 % ointment, Apply topically 2 times a day., Disp: , Rfl:     NON FORMULARY, Take by mouth once daily. DDM B12 1000 mcg tab, Disp: , Rfl:     predniSONE (Deltasone) 20 mg tablet, Take 2 tablets by mouth once daily for 2 doses., Disp: , Rfl:     valACYclovir (Valtrex) 1 gram tablet, Take 2 tablets (2,000 mg) by mouth. At the onset of symptoms & repeat in 12 hours., Disp: , Rfl:      Tobacco Use: Low Risk  (7/22/2024)    Patient History     Smoking Tobacco Use: Never     Smokeless Tobacco Use: Never     Passive Exposure: Not on file        Alcohol Use: Not on file        Social History     Substance and Sexual Activity   Drug Use Never        Physical Exam:  Visit Vitals  /69   Temp 36.2 °C (97.2 °F) (Temporal)   Ht 1.651 m (5' 5\")   BMI 36.28 kg/m²   OB Status Postmenopausal   Smoking Status Never   BSA 2.13 m²      General: Patient is alert, oriented, cooperative in no apparent distress.  Head: Normocephalic, atraumatic.  Eyes: PERRL, EOMI, Conjunctiva is clear. No nystagmus.  Ears: Right Ear-- Pinna is normal.  External auditory canal is []. Tympanic membrane is [intact, translucent and has good mobility with my pneumatic otoscope. No effusion].  Mastoid is nontender.  Left ear-- Pinna is normal.  External auditory canal is patent. Tympanic membrane is [intact, translucent and has good mobility with my pneumatic otoscope.  No effusion].  Mastoid is nontender.  Nose: Septum is relatively straight.  No septal perforation or lesions. No septal hematoma/ seroma.  No signs of bleeding.  Inferior turbinates are mildly swollen.   No evidence of intranasal polyps.  No infectious drainage.  Throat:  Floor of mouth is clear, no masses.  Tongue appears normal, no lesions or masses. Gums, gingiva, buccal mucosa appear pink and moist, no lesions. Teeth are in good " repair.  No obvious dental infections.  Peritonsillar regions appear symmetric without swelling.  Hard and soft palate appear normal, no obvious cleft. Uvula is midline.  Oropharynx: No lesions. Retropharyngeal wall is flat.  No active postnasal drip.  Neck: Supple,  no lymphadenopathy.  No masses.  Salivary Glands: Symmetric bilaterally.  No palpable masses.  No evidence of acute infection or salivary stones  Neurologic: Cranial Nerves 2-12 are grossly intact without focal deficits. Cerebellar function testing is normal.     Results:   I reviewed her most recent CT scan from June 2024 and she does have minimal chronic changes involving her maxillary and ethmoid sinuses as well as her sphenoid sinus.  She does have a small osteoma along her left ethmoid sinus.  No bony destruction.    Procedure:   Preoperative diagnosis: History of recurrent sinusitis with asthma and allergies-rule out intranasal polyps  Postoperative diagnosis: same  Procedure: Rigid nasal endoscopy  Surgeon: Lj Guevara DO  Assist: None  Anesthesia: 4% topical lidocaine mixed with 0.5% Afrin nasal spray 1:1  EBL: Minimal  Complications: None  Disposition: The patient tolerated the procedure well and there were no complications.  The patient was discharged home in stable condition.    Procedure:  After informed consent was obtained with the various risks, benefits, complications, and alternatives explained to the patient/ guardian, the patient was sat up in the ENT chair and  both nostrils were sprayed down with topical lidocaine 4% and .05% Afrin solution.   After allowing this to take effect, the Zero degree rigid endoscope was advanced into both nostrils. The following areas were visualized:    Bilateral nasal passages, nasal septum, nasal turbinates, ostiomeatal complexes and sinus ostia, nasopharynx and eustachian tube orifices. All structures were found to be normal except as follows:    Septum is relatively straight.  Her ostiomeatal  complexes are clear bilaterally.  No pus polyps or lesions.  Inferior turbinates are moderately swollen.  Nasopharynx is clear.  No masses.    The patient tolerated the procedure well and there were no complications.    Lj Guevara DO, FAOCO    Lj Guevara DO

## 2024-08-19 ENCOUNTER — PATIENT MESSAGE (OUTPATIENT)
Dept: DERMATOLOGY | Facility: CLINIC | Age: 74
End: 2024-08-19
Payer: MEDICARE

## 2024-08-26 ENCOUNTER — APPOINTMENT (OUTPATIENT)
Dept: DERMATOLOGY | Facility: CLINIC | Age: 74
End: 2024-08-26
Payer: MEDICARE

## 2024-08-26 DIAGNOSIS — B35.1 ONYCHOMYCOSIS: Primary | ICD-10-CM

## 2024-08-26 PROCEDURE — 99213 OFFICE O/P EST LOW 20 MIN: CPT | Performed by: NURSE PRACTITIONER

## 2024-08-26 PROCEDURE — 1159F MED LIST DOCD IN RCRD: CPT | Performed by: NURSE PRACTITIONER

## 2024-08-26 PROCEDURE — 1036F TOBACCO NON-USER: CPT | Performed by: NURSE PRACTITIONER

## 2024-08-26 RX ORDER — TERBINAFINE HYDROCHLORIDE 250 MG/1
TABLET ORAL
Qty: 7 TABLET | Refills: 2 | Status: SHIPPED | OUTPATIENT
Start: 2024-08-26 | End: 2024-08-27 | Stop reason: SDUPTHER

## 2024-08-26 NOTE — PROGRESS NOTES
Subjective     Therese Davison is a 73 y.o. female who presents for the following: Nail Problem.   Established patient in for nail issues to bilateral toenails patient states that it started 1 year ago on her great toenails bilaterally while she was getting pedicures and the woman who did her pedicures told her it was from the shoes she was wearing.  Patient states that she has since gone to a foot and ankle clinic and was prescribed Tolnaftate 1% and had complete removal of bilateral great toenails in early spring of this year patient states that she was using the topical and did not see any changes when the nail grew and so she got a second opinion at the same foot and ankle clinic with no changes to current prescription.  Patient states she looked online herself and found an over-the-counter fungal nail treatment that she has been applying currently.  Ingredients in the over-the-counter brand is aloe vera, vitamin E, tea tree oil, lavender oil, jojoba oil, clove oil.    Review of Systems:  No other skin or systemic complaints other than what is documented elsewhere in the note.    The following portions of the chart were reviewed this encounter and updated as appropriate:       Skin Cancer History  BCC- mid chest-08/2021  BCC- left medial frontal scalp-2020  BCC- Right medial frontal scalp-2020  BCC- Nasal Philtrum -2020    Specialty Problems          Dermatology Problems    Melanocytic nevi of face    Basal cell carcinoma (BCC) of overlapping sites of skin     Last Assessment & Plan: Formatting of this note might be different from the original. Assessment: removed from chest, lip,forehead,monitored per PCP         Actinic keratosis    Basal cell carcinoma of skin of other part of trunk    Basal cell carcinoma of skin of other parts of face    Basal cell carcinoma of skin of scalp and neck    Hemangioma of skin and subcutaneous tissue    Melanocytic nevi of lower limb, including hip    Melanocytic nevi of  other parts of face    Melanocytic nevi of trunk    Melanocytic nevi of unspecified upper limb, including shoulder    Neoplasm of uncertain behavior of skin    Other melanin hyperpigmentation    Other seborrheic keratosis    Personal history of other malignant neoplasm of skin    Scar condition and fibrosis of skin     Past Medical History:  Therese Davison  has a past medical history of Age-related osteoporosis without current pathological fracture, Body mass index (BMI) 34.0-34.9, adult (08/29/2022), Dorsalgia, unspecified, Encounter for follow-up examination after completed treatment for malignant neoplasm (06/08/2021), Encounter for general adult medical examination without abnormal findings (05/03/2022), Encounter for immunization, Encounter for screening for osteoporosis (03/25/2021), Flushing (11/08/2020), Hyperlipidemia, unspecified (03/18/2021), Impacted cerumen, bilateral (02/22/2022), Impacted cerumen, bilateral (02/22/2022), Impacted cerumen, right ear (11/09/2021), Long term (current) use of aromatase inhibitors (05/06/2022), Long term (current) use of aromatase inhibitors (06/08/2021), Mammographic calcification found on diagnostic imaging of breast (06/19/2019), Old myocardial infarction (07/10/2019), Other specified disorders of eye and adnexa (07/21/2022), Other specified postprocedural states (11/08/2020), Personal history of irradiation (11/08/2020), Personal history of malignant neoplasm of breast (05/06/2022), Personal history of other diseases of the female genital tract (11/08/2020), Personal history of other diseases of the nervous system and sense organs (11/23/2021), Personal history of other diseases of the respiratory system (10/16/2019), Personal history of other diseases of the respiratory system (10/03/2022), Personal history of other drug therapy (09/23/2021), Personal history of other specified conditions (11/08/2020), Personal history of other specified conditions (07/21/2022),  Personal history of other specified conditions (10/03/2022), Personal history of other specified conditions (03/18/2021), Personal history of other specified conditions (09/10/2022), Personal history of other specified conditions (09/10/2022), Polyosteoarthritis, unspecified (03/29/2021), Unspecified chronic otitis externa, unspecified ear (02/22/2022), Unspecified chronic otitis externa, unspecified ear (02/22/2022), and Unspecified symptoms and signs involving the genitourinary system (09/08/2022).    Past Surgical History:  Therese Davison  has a past surgical history that includes Other surgical history (06/19/2019); Tonsillectomy (10/16/2019); Other surgical history (11/08/2020); Other surgical history (05/03/2022); Other surgical history (05/03/2022); Other surgical history (05/06/2022); Tubal ligation (11/08/2020); Other surgical history (11/08/2020); Other surgical history (11/08/2020); and Breast lumpectomy (Left, 2019).    Family History:  Patient family history includes Arthritis in an other family member; Breast cancer (age of onset: 64) in her sister; Breast cancer (age of onset: 68) in her mother and another family member; Cancer in her mother and another family member; Emphysema in her father; Kidney disease in her mother; environmental allergies in her mother.    Social History:  Therese Davison  reports that she has never smoked. She has never used smokeless tobacco. She reports that she does not currently use alcohol. She reports that she does not use drugs.    Allergies:  Aspirin, Dog dander, Ibuprofen, Naproxen, Other, Wool, Morphine, Naproxen sodium, and Lanolin    Current Medications / CAM's:    Current Outpatient Medications:     albuterol (ProAir RespiClick) 90 mcg/actuation aerosol powdr breath activated inhaler, Inhale., Disp: , Rfl:     albuterol 2.5 mg /3 mL (0.083 %) nebulizer solution, Take 3 mL (2.5 mg) by nebulization every 4 hours if needed., Disp: , Rfl:     ammonium lactate  (Lac-Hydrin) 12 % lotion, Apply topically once daily., Disp: 225 g, Rfl: 3    anastrozole (Arimidex) 1 mg tablet, Take 1 tablet (1 mg total) by mouth once daily., Disp: 90 tablet, Rfl: 2    apixaban (Eliquis) 5 mg tablet, Take 1 tablet (5 mg) by mouth twice a day., Disp: , Rfl:     Bacillus coagulans (Probiotic, B. coagulans,) 1 billion cell tablet,chewable, Chew., Disp: , Rfl:     BACILLUS SUBTILIS-INULIN ORAL, Take by mouth., Disp: , Rfl:     cycloSPORINE (Restasis) 0.05 % ophthalmic emulsion, Administer 1 drop into affected eye(s) twice a day., Disp: , Rfl:     EPINEPHrine 0.3 mg/0.3 mL injection syringe, Inject 0.3 mL intramuscularly as needed., Disp: , Rfl:     ergocalciferol, vitamin D2, (VITAMIN D2 ORAL), Take by mouth., Disp: , Rfl:     fluticasone (Flonase) 50 mcg/actuation nasal spray, Administer 1 spray into affected nostril(s) 2 times a day., Disp: , Rfl:     fluticasone propion-salmeteroL (Advair HFA) 45-21 mcg/actuation inhaler, Inhale., Disp: , Rfl:     meclizine (Antivert) 25 mg tablet, Take 1 tablet (25 mg) by mouth 3 times a day as needed for dizziness. (Patient not taking: Reported on 7/22/2024), Disp: 30 tablet, Rfl: 0    metoprolol succinate XL (Toprol-XL) 25 mg 24 hr tablet, Take 1 tablet (25 mg) by mouth once daily., Disp: , Rfl:     mupirocin (Bactroban) 2 % ointment, Apply topically 2 times a day., Disp: , Rfl:     NON FORMULARY, Take by mouth once daily. DDM B12 1000 mcg tab, Disp: , Rfl:     omalizumab (Xolair) 150 mg injection, Inject under the skin., Disp: , Rfl:     omalizumab (Xolair) 75 mg/0.5 mL injection, Inject under the skin., Disp: , Rfl:     predniSONE (Deltasone) 20 mg tablet, Take 2 tablets by mouth once daily for 2 doses., Disp: , Rfl:     terbinafine (LamISIL) 250 mg tablet, Take two tablets daily for 7 days a month for three months, Disp: 7 tablet, Rfl: 2    valACYclovir (Valtrex) 1 gram tablet, Take 2 tablets (2,000 mg) by mouth. At the onset of symptoms & repeat in 12  hours., Disp: , Rfl:      Objective   Well appearing patient in no apparent distress; mood and affect are within normal limits.      Assessment/Plan   1. Onychomycosis (3)  Left 3rd Toenail, Right 3rd Toenail, Right Hallux Toenail  History of previously diagnosed onychomycosis treated by podiatry with excision of nail to bilateral great toes and use of topical antifungals.  On exam today is evidence of thickened nails with known minimal subungual debris.  Patient states she does have a long history of getting pedicures so trauma has likely contributed over time so just as the nail abnormalities.  Patient states this is extremely cosmetically bothersome to her    AST  13 - 35 U/L 13   ALT  7 - 38 U/L 11     PLAN:  Terbinafine 500mg daily for 7 days a week for 3 months   We discussed treatment options including topical treatment patient states she was encouraged by podiatry to have laser which she is still considering  We discussed the impact of pedicures and that the textbook recommendation would be to discontinue.  Patient states she would like to have polish on her nails for cosmetic reasons which I support for some time however topical antifungals would not be an appropriate treatment alternative    Related Medications  terbinafine (LamISIL) 250 mg tablet  Take two tablets daily for 7 days a month for three months

## 2024-08-27 ENCOUNTER — TELEPHONE (OUTPATIENT)
Dept: DERMATOLOGY | Facility: CLINIC | Age: 74
End: 2024-08-27
Payer: MEDICARE

## 2024-08-27 DIAGNOSIS — B35.1 ONYCHOMYCOSIS: ICD-10-CM

## 2024-08-27 RX ORDER — TERBINAFINE HYDROCHLORIDE 250 MG/1
TABLET ORAL
Qty: 14 TABLET | Refills: 2 | Status: SHIPPED | OUTPATIENT
Start: 2024-08-27

## 2024-08-27 NOTE — TELEPHONE ENCOUNTER
Patients pharmacy called for clarification on rx sent, terbinafine was sent quantity 7 , two times daily for 7 days. Please advise.

## 2024-09-19 ENCOUNTER — TELEMEDICINE (OUTPATIENT)
Dept: PRIMARY CARE | Facility: CLINIC | Age: 74
End: 2024-09-19
Payer: MEDICARE

## 2024-09-19 DIAGNOSIS — J45.40 MODERATE PERSISTENT ASTHMA WITHOUT COMPLICATION (HHS-HCC): ICD-10-CM

## 2024-09-19 DIAGNOSIS — E66.01 OBESITY, MORBID (MULTI): ICD-10-CM

## 2024-09-19 DIAGNOSIS — E78.2 MIXED HYPERLIPIDEMIA: ICD-10-CM

## 2024-09-19 DIAGNOSIS — R73.9 HYPERGLYCEMIA: ICD-10-CM

## 2024-09-19 DIAGNOSIS — D50.8 IRON DEFICIENCY ANEMIA SECONDARY TO INADEQUATE DIETARY IRON INTAKE: Primary | ICD-10-CM

## 2024-09-19 PROCEDURE — 99442 PR PHYS/QHP TELEPHONE EVALUATION 11-20 MIN: CPT | Performed by: FAMILY MEDICINE

## 2024-09-19 ASSESSMENT — ENCOUNTER SYMPTOMS
DIZZINESS: 0
BLOOD IN STOOL: 0
ARTHRALGIAS: 0
PALPITATIONS: 0
CONSTIPATION: 0
SLEEP DISTURBANCE: 0
HEADACHES: 0
RECTAL PAIN: 0
ABDOMINAL PAIN: 0
MYALGIAS: 0
POLYDIPSIA: 0
NECK STIFFNESS: 0
ADENOPATHY: 0
FATIGUE: 0
DYSPHORIC MOOD: 0
SORE THROAT: 0
FEVER: 0
DIARRHEA: 0
TROUBLE SWALLOWING: 0
HEMATURIA: 0
SHORTNESS OF BREATH: 0
FLANK PAIN: 0
ABDOMINAL DISTENTION: 0
COLOR CHANGE: 0
CONFUSION: 0
CHEST TIGHTNESS: 0
SINUS PAIN: 0
POLYPHAGIA: 0
STRIDOR: 0
SEIZURES: 0
EYE PAIN: 0
AGITATION: 0
DECREASED CONCENTRATION: 0
ACTIVITY CHANGE: 0
SPEECH DIFFICULTY: 0
DYSURIA: 0
APPETITE CHANGE: 0
CONSTITUTIONAL NEGATIVE: 1
SINUS PRESSURE: 0
PHOTOPHOBIA: 0
RHINORRHEA: 0

## 2024-09-19 NOTE — PROGRESS NOTES
Subjective   Patient ID: Therese Davison is a 73 y.o. female who presents for ER Follow-up.    Consent obtained by Therese Davison for audio communication. Total time for this audio communication visit is 12 minutes.     HPI   Patient was at ER for bronchitis 09/12/2024-09/13/2024  Still experiencing upset stomach intermittently   Patient is concerned with blood work that was drawn, states her glucose was 134 and she had not eaten anything that day    Review of Systems   Constitutional: Negative.  Negative for activity change, appetite change, fatigue and fever.   HENT:  Negative for congestion, dental problem, ear discharge, ear pain, mouth sores, rhinorrhea, sinus pressure, sinus pain, sore throat, tinnitus and trouble swallowing.    Eyes:  Negative for photophobia, pain and visual disturbance.   Respiratory:  Positive for cough. Negative for chest tightness, shortness of breath and stridor.    Cardiovascular:  Negative for chest pain and palpitations.   Gastrointestinal:  Negative for abdominal distention, abdominal pain, blood in stool, constipation, diarrhea and rectal pain.   Endocrine: Negative for cold intolerance, heat intolerance, polydipsia, polyphagia and polyuria.   Genitourinary:  Negative for dysuria, flank pain, hematuria and urgency.   Musculoskeletal:  Negative for arthralgias, gait problem, myalgias and neck stiffness.   Skin:  Negative for color change and rash.   Allergic/Immunologic: Negative for environmental allergies and food allergies.   Neurological:  Negative for dizziness, seizures, syncope, speech difficulty and headaches.   Hematological:  Negative for adenopathy.   Psychiatric/Behavioral:  Negative for agitation, confusion, decreased concentration, dysphoric mood and sleep disturbance. The patient is nervous/anxious.        Objective   There were no vitals taken for this visit.    Physical Exam  Constitutional: Well developed, well nourished, alert and in no acute distress    Psychiatric: Mood calm and affect normal    Telephone Visit - Audio Communication Only     Assessment/Plan   Problem List Items Addressed This Visit             ICD-10-CM    Asthma ([facility]) J45.909    Mixed hyperlipidemia E78.2    Relevant Orders    Comprehensive Metabolic Panel    Lipid Panel    Obesity, morbid (Multi) E66.01     Monitored/improving          Other Visit Diagnoses         Codes    Iron deficiency anemia secondary to inadequate dietary iron intake    -  Primary D50.8    Relevant Orders    CBC and Auto Differential    Hyperglycemia     R73.9    Relevant Orders    Hemoglobin A1c          Consent obtained by Therese Davison for audio communication. Total time for this audio communication visit is 12 minutes.      Provider Attestation - Scribe documentation    All medical record entries made by the Scribe were at my direction and personally dictated by me. I have reviewed the chart and agree that the record accurately reflects my personal performance of the history, physical exam, discussion and plan.

## 2024-09-20 PROBLEM — E66.01 OBESITY, MORBID (MULTI): Status: ACTIVE | Noted: 2024-09-20

## 2024-09-20 ASSESSMENT — ENCOUNTER SYMPTOMS
NERVOUS/ANXIOUS: 1
COUGH: 1

## 2024-09-20 NOTE — PATIENT INSTRUCTIONS
Follow up in    Continue current medications and therapy for chronic medical conditions.    Patient was advised importance of proper diet/nutrition in addition adequate hydration. Patient was encouraged moderate exercise program to include 30 minutes daily for 5 days of the week or 150 minutes weekly. Patient will follow-up with us as scheduled.    I personally reviewed the OARRS report for this patient. I have considered the risks of abuse, dependence, addiction, and diversion.    UDS/CSA:    Obtain labs to include CMP, CBC, hemoglobin A1c and lipid panel

## 2024-10-21 DIAGNOSIS — B35.1 ONYCHOMYCOSIS: ICD-10-CM

## 2024-10-21 RX ORDER — TERBINAFINE HYDROCHLORIDE 250 MG/1
TABLET ORAL
Qty: 14 TABLET | Refills: 0 | Status: SHIPPED | OUTPATIENT
Start: 2024-10-21

## 2024-10-21 RX ORDER — TERBINAFINE HYDROCHLORIDE 250 MG/1
TABLET ORAL
Qty: 14 TABLET | Refills: 2 | OUTPATIENT
Start: 2024-10-21

## 2024-11-04 ENCOUNTER — TELEPHONE (OUTPATIENT)
Dept: PRIMARY CARE | Facility: CLINIC | Age: 74
End: 2024-11-04

## 2024-11-04 NOTE — TELEPHONE ENCOUNTER
Pt is wanting to come in for Flu and Kenalog injection prior to her AMW in December. Please advise if ok with [facility] and if ok to place on MA schedule.

## 2024-11-08 ENCOUNTER — CLINICAL SUPPORT (OUTPATIENT)
Dept: PRIMARY CARE | Facility: CLINIC | Age: 74
End: 2024-11-08
Payer: MEDICARE

## 2024-11-08 DIAGNOSIS — Z23 NEED FOR INFLUENZA VACCINATION: ICD-10-CM

## 2024-11-08 DIAGNOSIS — J30.1 SEASONAL ALLERGIC RHINITIS DUE TO POLLEN: ICD-10-CM

## 2024-11-08 PROCEDURE — G0008 ADMIN INFLUENZA VIRUS VAC: HCPCS | Performed by: FAMILY MEDICINE

## 2024-11-08 PROCEDURE — 90662 IIV NO PRSV INCREASED AG IM: CPT | Performed by: FAMILY MEDICINE

## 2024-11-08 RX ORDER — TRIAMCINOLONE ACETONIDE 40 MG/ML
80 INJECTION, SUSPENSION INTRA-ARTICULAR; INTRAMUSCULAR ONCE
Status: SHIPPED | OUTPATIENT
Start: 2024-11-08

## 2024-12-09 ENCOUNTER — LAB (OUTPATIENT)
Dept: LAB | Facility: LAB | Age: 74
End: 2024-12-09
Payer: MEDICARE

## 2024-12-09 DIAGNOSIS — R53.82 CHRONIC FATIGUE: ICD-10-CM

## 2024-12-09 DIAGNOSIS — E55.9 VITAMIN D DEFICIENCY: ICD-10-CM

## 2024-12-09 DIAGNOSIS — D64.9 ANEMIA, UNSPECIFIED TYPE: ICD-10-CM

## 2024-12-09 DIAGNOSIS — R73.9 HYPERGLYCEMIA: ICD-10-CM

## 2024-12-09 DIAGNOSIS — E78.2 MIXED HYPERLIPIDEMIA: ICD-10-CM

## 2024-12-09 DIAGNOSIS — D50.8 IRON DEFICIENCY ANEMIA SECONDARY TO INADEQUATE DIETARY IRON INTAKE: ICD-10-CM

## 2024-12-09 LAB
25(OH)D3 SERPL-MCNC: 36 NG/ML (ref 30–100)
ALBUMIN SERPL BCP-MCNC: 4.1 G/DL (ref 3.4–5)
ALP SERPL-CCNC: 76 U/L (ref 33–136)
ALT SERPL W P-5'-P-CCNC: 9 U/L (ref 7–45)
ANION GAP SERPL CALC-SCNC: 10 MMOL/L (ref 10–20)
AST SERPL W P-5'-P-CCNC: 9 U/L (ref 9–39)
BASOPHILS # BLD AUTO: 0.06 X10*3/UL (ref 0–0.1)
BASOPHILS NFR BLD AUTO: 0.6 %
BILIRUB SERPL-MCNC: 0.4 MG/DL (ref 0–1.2)
BUN SERPL-MCNC: 15 MG/DL (ref 6–23)
CALCIUM SERPL-MCNC: 9.1 MG/DL (ref 8.6–10.3)
CHLORIDE SERPL-SCNC: 102 MMOL/L (ref 98–107)
CHOLEST SERPL-MCNC: 236 MG/DL (ref 0–199)
CHOLESTEROL/HDL RATIO: 3.2
CO2 SERPL-SCNC: 31 MMOL/L (ref 21–32)
CREAT SERPL-MCNC: 0.82 MG/DL (ref 0.5–1.05)
EGFRCR SERPLBLD CKD-EPI 2021: 75 ML/MIN/1.73M*2
EOSINOPHIL # BLD AUTO: 0.19 X10*3/UL (ref 0–0.4)
EOSINOPHIL NFR BLD AUTO: 2 %
ERYTHROCYTE [DISTWIDTH] IN BLOOD BY AUTOMATED COUNT: 13.4 % (ref 11.5–14.5)
FERRITIN SERPL-MCNC: 70 NG/ML (ref 8–150)
FOLATE SERPL-MCNC: 7.8 NG/ML
GLUCOSE SERPL-MCNC: 87 MG/DL (ref 74–99)
HCT VFR BLD AUTO: 38.5 % (ref 36–46)
HDLC SERPL-MCNC: 73.6 MG/DL
HGB BLD-MCNC: 12.5 G/DL (ref 12–16)
IMM GRANULOCYTES # BLD AUTO: 0.03 X10*3/UL (ref 0–0.5)
IMM GRANULOCYTES NFR BLD AUTO: 0.3 % (ref 0–0.9)
IRON SATN MFR SERPL: 22 % (ref 25–45)
IRON SERPL-MCNC: 81 UG/DL (ref 35–150)
LDLC SERPL CALC-MCNC: 145 MG/DL
LYMPHOCYTES # BLD AUTO: 2.56 X10*3/UL (ref 0.8–3)
LYMPHOCYTES NFR BLD AUTO: 27.3 %
MCH RBC QN AUTO: 28.7 PG (ref 26–34)
MCHC RBC AUTO-ENTMCNC: 32.5 G/DL (ref 32–36)
MCV RBC AUTO: 88 FL (ref 80–100)
MONOCYTES # BLD AUTO: 0.76 X10*3/UL (ref 0.05–0.8)
MONOCYTES NFR BLD AUTO: 8.1 %
NEUTROPHILS # BLD AUTO: 5.78 X10*3/UL (ref 1.6–5.5)
NEUTROPHILS NFR BLD AUTO: 61.7 %
NON HDL CHOLESTEROL: 162 MG/DL (ref 0–149)
NRBC BLD-RTO: 0 /100 WBCS (ref 0–0)
PLATELET # BLD AUTO: 300 X10*3/UL (ref 150–450)
POTASSIUM SERPL-SCNC: 4.7 MMOL/L (ref 3.5–5.3)
PROT SERPL-MCNC: 6.8 G/DL (ref 6.4–8.2)
RBC # BLD AUTO: 4.36 X10*6/UL (ref 4–5.2)
SODIUM SERPL-SCNC: 138 MMOL/L (ref 136–145)
TIBC SERPL-MCNC: 376 UG/DL (ref 240–445)
TRIGL SERPL-MCNC: 89 MG/DL (ref 0–149)
TSH SERPL-ACNC: 3.28 MIU/L (ref 0.44–3.98)
UIBC SERPL-MCNC: 295 UG/DL (ref 110–370)
VIT B12 SERPL-MCNC: 653 PG/ML (ref 211–911)
VLDL: 18 MG/DL (ref 0–40)
WBC # BLD AUTO: 9.4 X10*3/UL (ref 4.4–11.3)

## 2024-12-09 PROCEDURE — 83550 IRON BINDING TEST: CPT

## 2024-12-09 PROCEDURE — 82607 VITAMIN B-12: CPT

## 2024-12-09 PROCEDURE — 36415 COLL VENOUS BLD VENIPUNCTURE: CPT

## 2024-12-09 PROCEDURE — 85025 COMPLETE CBC W/AUTO DIFF WBC: CPT

## 2024-12-09 PROCEDURE — 84443 ASSAY THYROID STIM HORMONE: CPT

## 2024-12-09 PROCEDURE — 83036 HEMOGLOBIN GLYCOSYLATED A1C: CPT

## 2024-12-09 PROCEDURE — 80053 COMPREHEN METABOLIC PANEL: CPT

## 2024-12-09 PROCEDURE — 82728 ASSAY OF FERRITIN: CPT

## 2024-12-09 PROCEDURE — 82306 VITAMIN D 25 HYDROXY: CPT

## 2024-12-09 PROCEDURE — 82746 ASSAY OF FOLIC ACID SERUM: CPT

## 2024-12-09 PROCEDURE — 83540 ASSAY OF IRON: CPT

## 2024-12-09 PROCEDURE — 80061 LIPID PANEL: CPT

## 2024-12-10 LAB
EST. AVERAGE GLUCOSE BLD GHB EST-MCNC: 117 MG/DL
HBA1C MFR BLD: 5.7 %

## 2024-12-18 ENCOUNTER — APPOINTMENT (OUTPATIENT)
Dept: PRIMARY CARE | Facility: CLINIC | Age: 74
End: 2024-12-18
Payer: MEDICARE

## 2024-12-18 VITALS
BODY MASS INDEX: 36.75 KG/M2 | HEIGHT: 65 IN | HEART RATE: 57 BPM | OXYGEN SATURATION: 96 % | RESPIRATION RATE: 16 BRPM | WEIGHT: 220.6 LBS | SYSTOLIC BLOOD PRESSURE: 124 MMHG | DIASTOLIC BLOOD PRESSURE: 80 MMHG | TEMPERATURE: 97.3 F

## 2024-12-18 DIAGNOSIS — S29.019A STRAIN, DORSAL, INITIAL ENCOUNTER: ICD-10-CM

## 2024-12-18 DIAGNOSIS — Z00.00 MEDICARE ANNUAL WELLNESS VISIT, SUBSEQUENT: Primary | ICD-10-CM

## 2024-12-18 DIAGNOSIS — E66.09 EXOGENOUS OBESITY: ICD-10-CM

## 2024-12-18 DIAGNOSIS — J45.40 MODERATE PERSISTENT ASTHMA WITHOUT COMPLICATION (HHS-HCC): ICD-10-CM

## 2024-12-18 PROCEDURE — G0439 PPPS, SUBSEQ VISIT: HCPCS | Performed by: FAMILY MEDICINE

## 2024-12-18 PROCEDURE — 99497 ADVNCD CARE PLAN 30 MIN: CPT | Performed by: FAMILY MEDICINE

## 2024-12-18 RX ORDER — PREDNISONE 10 MG/1
TABLET ORAL
Qty: 25 TABLET | Refills: 0 | Status: SHIPPED | OUTPATIENT
Start: 2024-12-18

## 2024-12-18 ASSESSMENT — PATIENT HEALTH QUESTIONNAIRE - PHQ9
1. LITTLE INTEREST OR PLEASURE IN DOING THINGS: NOT AT ALL
2. FEELING DOWN, DEPRESSED OR HOPELESS: NOT AT ALL
SUM OF ALL RESPONSES TO PHQ9 QUESTIONS 1 AND 2: 0

## 2024-12-18 ASSESSMENT — ACTIVITIES OF DAILY LIVING (ADL)
BATHING: INDEPENDENT
DRESSING: INDEPENDENT
GROCERY_SHOPPING: INDEPENDENT
TAKING_MEDICATION: INDEPENDENT
MANAGING_FINANCES: INDEPENDENT
DOING_HOUSEWORK: INDEPENDENT

## 2024-12-18 NOTE — PROGRESS NOTES
Subjective   Reason for Visit: Therese Davison is an 74 y.o. female here for a Medicare Wellness visit.     Past Medical, Surgical, and Family History reviewed and updated in chart.         Patient presents in office for a AMW. Patient has no specific requirements for the examination.        Patient Care Team:  Lonnie Medina DO as PCP - General (Family Medicine)  Lonnie Medina DO as PCP - [facility] Attributed Provider     Review of Systems   Constitutional: Negative.  Negative for activity change, appetite change, fatigue and fever.   HENT:  Negative for congestion, dental problem, ear discharge, ear pain, mouth sores, rhinorrhea, sinus pressure, sinus pain, sore throat, tinnitus and trouble swallowing.    Eyes:  Negative for photophobia, pain and visual disturbance.   Respiratory:  Positive for cough and shortness of breath. Negative for chest tightness and stridor.    Cardiovascular:  Negative for chest pain and palpitations.   Gastrointestinal:  Negative for abdominal distention, abdominal pain, blood in stool, constipation, diarrhea and rectal pain.   Endocrine: Negative for cold intolerance, heat intolerance, polydipsia, polyphagia and polyuria.   Genitourinary:  Negative for dysuria, flank pain, hematuria and urgency.   Musculoskeletal:  Negative for arthralgias, gait problem, myalgias and neck stiffness.   Skin:  Negative for color change and rash.   Allergic/Immunologic: Negative for environmental allergies and food allergies.   Neurological:  Negative for dizziness, seizures, syncope, speech difficulty and headaches.   Hematological:  Negative for adenopathy.   Psychiatric/Behavioral:  Negative for agitation, confusion, decreased concentration, dysphoric mood and sleep disturbance. The patient is not nervous/anxious.        Objective   Vitals:  /80 (BP Location: Right arm, Patient Position: Sitting, BP Cuff Size: Large adult)   Pulse 57   Temp 36.3 °C (97.3 °F) (Temporal)   Resp 16    "Ht 1.651 m (5' 5\")   Wt 100 kg (220 lb 9.6 oz)   SpO2 96%   BMI 36.71 kg/m²       Physical Exam  Vitals reviewed.   Constitutional:       General: She is not in acute distress.     Appearance: She is obese. She is not ill-appearing or diaphoretic.   HENT:      Head: Normocephalic.      Right Ear: Tympanic membrane and external ear normal.      Left Ear: Tympanic membrane and external ear normal.      Nose: Nose normal. No congestion.      Mouth/Throat:      Pharynx: No posterior oropharyngeal erythema.   Eyes:      General:         Right eye: No discharge.         Left eye: No discharge.      Extraocular Movements: Extraocular movements intact.      Conjunctiva/sclera: Conjunctivae normal.      Pupils: Pupils are equal, round, and reactive to light.   Cardiovascular:      Rate and Rhythm: Normal rate and regular rhythm.      Pulses: Normal pulses.      Heart sounds: Normal heart sounds. No murmur heard.  Pulmonary:      Effort: No respiratory distress.      Breath sounds: No wheezing or rales.      Comments: Diminished breath sounds bilaterally, respiratory rate 18  Chest:      Chest wall: No tenderness.   Abdominal:      General: Bowel sounds are normal. There is distension.      Palpations: There is no mass.      Tenderness: There is no abdominal tenderness. There is no guarding.   Musculoskeletal:         General: No tenderness. Normal range of motion.      Cervical back: Normal range of motion and neck supple. No tenderness.      Right lower leg: No edema.      Left lower leg: No edema.   Skin:     General: Skin is dry.      Coloration: Skin is not jaundiced.      Findings: No bruising, erythema or rash.   Neurological:      General: No focal deficit present.      Mental Status: She is alert and oriented to person, place, and time. Mental status is at baseline.      Cranial Nerves: No cranial nerve deficit.      Sensory: No sensory deficit.      Coordination: Coordination normal.      Gait: Gait normal. "   Psychiatric:         Mood and Affect: Mood normal.         Thought Content: Thought content normal.         Judgment: Judgment normal.         Assessment & Plan  Strain, dorsal, initial encounter    Orders:    predniSONE (Deltasone) 10 mg tablet; Take 2 daily for 4 days , then one daily    Medicare annual wellness visit, subsequent         Moderate persistent asthma without complication ([facility])

## 2024-12-22 ASSESSMENT — ENCOUNTER SYMPTOMS
PALPITATIONS: 0
MYALGIAS: 0
CHEST TIGHTNESS: 0
NECK STIFFNESS: 0
FLANK PAIN: 0
SHORTNESS OF BREATH: 1
SINUS PAIN: 0
CONSTITUTIONAL NEGATIVE: 1
SLEEP DISTURBANCE: 0
COUGH: 1
SORE THROAT: 0
COLOR CHANGE: 0
ABDOMINAL PAIN: 0
CONFUSION: 0
HEMATURIA: 0
NERVOUS/ANXIOUS: 0
AGITATION: 0
SEIZURES: 0
HEADACHES: 0
ABDOMINAL DISTENTION: 0
POLYDIPSIA: 0
ACTIVITY CHANGE: 0
POLYPHAGIA: 0
STRIDOR: 0
DYSPHORIC MOOD: 0
EYE PAIN: 0
RECTAL PAIN: 0
SPEECH DIFFICULTY: 0
SINUS PRESSURE: 0
CONSTIPATION: 0
DYSURIA: 0
ARTHRALGIAS: 0
PHOTOPHOBIA: 0
APPETITE CHANGE: 0
DIZZINESS: 0
DECREASED CONCENTRATION: 0
DIARRHEA: 0
FATIGUE: 0
ADENOPATHY: 0
FEVER: 0
TROUBLE SWALLOWING: 0
BLOOD IN STOOL: 0
RHINORRHEA: 0

## 2024-12-23 NOTE — PATIENT INSTRUCTIONS
Follow up in    Continue current medications and therapy for chronic medical conditions.    Patient was advised importance of proper diet/nutrition in addition adequate hydration. Patient was encouraged moderate exercise program to include 30 minutes daily for 5 days of the week or 150 minutes weekly. Patient will follow-up with us as scheduled.    I personally reviewed the OARRS report for this patient. I have considered the risks of abuse, dependence, addiction, and diversion.    UDS/CSA:    Review lab results from December 2024    Review chest x-ray from September 2024    Prednisone 10 mg tapered #20 x 0    Complete Medicare annual wellness physical/exam    Counseled on advanced directive/16 minutes

## 2025-03-05 ENCOUNTER — APPOINTMENT (OUTPATIENT)
Facility: CLINIC | Age: 75
End: 2025-03-05
Payer: MEDICARE

## 2025-03-05 VITALS
SYSTOLIC BLOOD PRESSURE: 128 MMHG | TEMPERATURE: 96.7 F | BODY MASS INDEX: 37.39 KG/M2 | DIASTOLIC BLOOD PRESSURE: 79 MMHG | WEIGHT: 224.4 LBS | HEIGHT: 65 IN

## 2025-03-05 DIAGNOSIS — J35.1 LINGUAL TONSIL HYPERTROPHY: Primary | ICD-10-CM

## 2025-03-05 PROCEDURE — 1160F RVW MEDS BY RX/DR IN RCRD: CPT | Performed by: OTOLARYNGOLOGY

## 2025-03-05 PROCEDURE — 1159F MED LIST DOCD IN RCRD: CPT | Performed by: OTOLARYNGOLOGY

## 2025-03-05 PROCEDURE — 99214 OFFICE O/P EST MOD 30 MIN: CPT | Performed by: OTOLARYNGOLOGY

## 2025-03-05 PROCEDURE — 1123F ACP DISCUSS/DSCN MKR DOCD: CPT | Performed by: OTOLARYNGOLOGY

## 2025-03-05 PROCEDURE — 1036F TOBACCO NON-USER: CPT | Performed by: OTOLARYNGOLOGY

## 2025-03-05 PROCEDURE — 3008F BODY MASS INDEX DOCD: CPT | Performed by: OTOLARYNGOLOGY

## 2025-03-05 NOTE — PROGRESS NOTES
Impression:              1. Lingual tonsil hypertrophy             Recommendations/Plan:  I reassured the patient there is no evidence of any mass along the base of her tongue but more than likely her lingual tonsils were slightly enlarged.  She will let me know if she has any restriction to tongue movement or any pain or bleeding from the mouth or any difficulty swallowing.  She will continue to rinse her mouth after using her inhaler as this can also irritate the back of the throat and tongue.  If she notices any other changes to her tongue, she will let me know immediately and we will then scope her.    **This electronic medical record note was created with the use of voice recognition software.  Despite proofreading, typographical or grammatical errors may be present that could affect meaning of content **    Subjective:  Therese returns to the office today as a checkup of her tongue.  One of her physicians thought that she had some swelling along the base of her tongue.  She denies any difficulty swallowing or any hemoptysis or hematemesis.  She is not choking on foods or liquids.  No secondary symptoms such as unexplained loss of weight night sweats or fatigue.  Sometimes her mouth and tongue will be mildly irritated after she uses her oral inhaler.  She has been rinsing her mouth out afterwards.    Objective:    Visit Vitals  /79   Temp 35.9 °C (96.7 °F) (Temporal)        Current Outpatient Medications   Medication Instructions    albuterol (ProAir RespiClick) 90 mcg/actuation aerosol powdr breath activated inhaler Inhale.    albuterol 2.5 mg, Every 4 hours PRN    ammonium lactate (Lac-Hydrin) 12 % lotion Topical, Daily    anastrozole (ARIMIDEX) 1 mg, oral, Daily    apixaban (ELIQUIS) 5 mg, oral, 2 times daily    Bacillus coagulans (Probiotic, B. coagulans,) 1 billion cell tablet,chewable Chew.    BACILLUS SUBTILIS-INULIN ORAL Take by mouth.    cycloSPORINE (Restasis) 0.05 % ophthalmic emulsion 1  drop, 2 times daily    EPINEPHrine 0.3 mg/0.3 mL injection syringe Inject 0.3 mL intramuscularly as needed.    ergocalciferol, vitamin D2, (VITAMIN D2 ORAL) Take by mouth.    fluticasone (Flonase) 50 mcg/actuation nasal spray 1 spray, 2 times daily    fluticasone propion-salmeteroL (Advair HFA) 45-21 mcg/actuation inhaler Inhale.    meclizine (ANTIVERT) 25 mg, oral, 3 times daily PRN    metoprolol succinate XL (TOPROL-XL) 25 mg, Daily RT    mupirocin (Bactroban) 2 % ointment 2 times daily    NON FORMULARY Daily    omalizumab (Xolair) 150 mg injection Inject under the skin.    omalizumab (Xolair) 75 mg/0.5 mL injection Inject under the skin.    predniSONE (Deltasone) 10 mg tablet Take 2 daily for 4 days , then one daily    terbinafine (LamISIL) 250 mg tablet Take two tablets daily for 7 days    valACYclovir (VALTREX) 2,000 mg        Allergies   Allergen Reactions    Aspirin Shortness of breath    Dog Dander Shortness of breath    Ibuprofen Anaphylaxis and Other     Causes asthma attacks    Naproxen Shortness of breath     Causes asthma attacks    Other Shortness of breath    Wool Rash    Morphine Unknown and Rash    Naproxen Sodium Unknown     Causes asthma attacks    Lanolin Rash        Physical Exam:  Ears-external canals are patent.  Tympanic membrane's are intact.  No effusion.  Mastoid nontender.  Nose-septum is straight.  No rhinorrhea.  Turbinates appear normal.  Oral cavity-floor mouth is clear.  Tongue is midline.  No restriction to tongue movement.  Base of tongue reveals mild lingual tonsil hypertrophy.  No masses noted.  Retropharyngeal wall is flat.  No postnasal drip.  No lesions.  Neck-supple no adenopathy    Results:  []    Procedure:  []    Lj Guevara, DO

## 2025-03-17 ENCOUNTER — PATIENT OUTREACH (OUTPATIENT)
Dept: PRIMARY CARE | Facility: CLINIC | Age: 75
End: 2025-03-17
Payer: MEDICARE

## 2025-03-17 NOTE — PROGRESS NOTES
Discharge Facility: [facility]  Discharge Diagnosis: Pneumonia left lung  Admission Date: 3/12/25  Discharge Date: 3/14/25    PCP Appointment Date: 3/21/25  Specialist Appointment Date: Derm 3/31/25  Hospital Encounter and Summary Linked: Yes    Hospital Encounter     See discharge assessment below for further details     Wrap Up  Wrap Up Additional Comments: This CM spoke with patient via phone. Successful transition of care outreach complete. Pt reports doing well at home since discharge. New meds reviewed. Pt reports constipation and sores on her nose.  Pt started taking her probiotic again. Pt denies any prescription medication questions. Pt denies CP and SOB. Pt is drinking extra fluids. Patient denies any further discharge questions/needs at this time. Emphasized that Follow up is needed after discharge to review the hospital recommendations, assess your response to your treatment. Pt has a scheduled appointment.  Pt aware of my availability for non-emergent concerns. Contact info provided to patient. (3/17/2025 12:14 PM)    Medications  Medications reviewed with patient/caregiver?: Yes (3/17/2025 12:14 PM)  Is the patient having any side effects they believe may be caused by any medication additions or changes?: No (3/17/2025 12:14 PM)  Does the patient have all medications ordered at discharge?: Yes (3/17/2025 12:14 PM)  Care Management Interventions: No intervention needed (3/17/2025 12:14 PM)  Prescription Comments: levoFLOXacin 500 mg tablet Commonly known as: LEVAQUIN Take 1 tablet by mouth once daily for 5 days.  predniSONE 10 mg tablet Commonly known as: DELTASONE Take 1 tablet by mouth once daily for 5 days. (3/17/2025 12:14 PM)  Is the patient taking all medications as directed (includes completed medication regime)?: Yes (3/17/2025 12:14 PM)  Care Management Interventions: Provided patient education (3/17/2025 12:14 PM)  Medication Comments: no noted issues obtaining medications (3/17/2025  12:14 PM)    Appointments  Does the patient have a primary care provider?: Yes (3/17/2025 12:14 PM)  Care Management Interventions: Verified appointment date/time/provider (3/17/2025 12:14 PM)  Has the patient kept scheduled appointments due by today?: Yes (3/17/2025 12:14 PM)  Care Management Interventions: Advised patient to keep appointment (3/17/2025 12:14 PM)    Self Management  What is the home health agency?: denies need (3/17/2025 12:14 PM)  What Durable Medical Equipment (DME) was ordered?: denies need (3/17/2025 12:14 PM)    Patient Teaching  Does the patient have access to their discharge instructions?: Yes (3/17/2025 12:14 PM)  Care Management Interventions: Reviewed instructions with patient (3/17/2025 12:14 PM)  What is the patient's perception of their health status since discharge?: Improving (3/17/2025 12:14 PM)  Is the patient/caregiver able to teach back the hierarchy of who to call/visit for symptoms/problems? PCP, Specialist, Home Health nurse, Urgent Care, ED, 911: Yes (3/17/2025 12:14 PM)

## 2025-03-21 ENCOUNTER — OFFICE VISIT (OUTPATIENT)
Dept: PRIMARY CARE | Facility: CLINIC | Age: 75
End: 2025-03-21
Payer: MEDICARE

## 2025-03-21 VITALS
BODY MASS INDEX: 36.99 KG/M2 | DIASTOLIC BLOOD PRESSURE: 80 MMHG | RESPIRATION RATE: 19 BRPM | TEMPERATURE: 97.2 F | HEART RATE: 68 BPM | OXYGEN SATURATION: 98 % | SYSTOLIC BLOOD PRESSURE: 122 MMHG | WEIGHT: 222 LBS | HEIGHT: 65 IN

## 2025-03-21 DIAGNOSIS — J96.01 ACUTE RESPIRATORY FAILURE WITH HYPOXIA: ICD-10-CM

## 2025-03-21 DIAGNOSIS — B35.1 ONYCHOMYCOSIS: ICD-10-CM

## 2025-03-21 DIAGNOSIS — J45.50 SEVERE PERSISTENT ASTHMA, UNCOMPLICATED (MULTI): ICD-10-CM

## 2025-03-21 DIAGNOSIS — J13 PNEUMONIA OF LEFT LUNG DUE TO STREPTOCOCCUS PNEUMONIAE, UNSPECIFIED PART OF LUNG: Primary | ICD-10-CM

## 2025-03-21 DIAGNOSIS — J45.40 MODERATE PERSISTENT ASTHMA WITHOUT COMPLICATION (HHS-HCC): ICD-10-CM

## 2025-03-21 DIAGNOSIS — J41.0 SIMPLE CHRONIC BRONCHITIS (MULTI): ICD-10-CM

## 2025-03-21 DIAGNOSIS — Z09 ENCOUNTER FOR EXAMINATION FOLLOWING TREATMENT AT HOSPITAL: ICD-10-CM

## 2025-03-21 PROBLEM — E66.01 OBESITY, MORBID (MULTI): Status: RESOLVED | Noted: 2024-09-20 | Resolved: 2025-03-21

## 2025-03-21 PROCEDURE — 99496 TRANSJ CARE MGMT HIGH F2F 7D: CPT | Performed by: FAMILY MEDICINE

## 2025-03-21 RX ORDER — ECONAZOLE NITRATE 10 MG/G
CREAM TOPICAL DAILY
Qty: 85 G | Refills: 1 | Status: SHIPPED | OUTPATIENT
Start: 2025-03-21

## 2025-03-21 RX ORDER — TEZEPELUMAB-EKKO 210 MG/1.9ML
210 INJECTION, SOLUTION SUBCUTANEOUS
COMMUNITY

## 2025-03-21 ASSESSMENT — ENCOUNTER SYMPTOMS
SEIZURES: 0
FATIGUE: 0
APPETITE CHANGE: 0
SPEECH DIFFICULTY: 0
POLYPHAGIA: 0
STRIDOR: 0
ARTHRALGIAS: 0
RECTAL PAIN: 0
EYE PAIN: 0
DYSPHORIC MOOD: 0
CONFUSION: 0
SLEEP DISTURBANCE: 0
HEMATURIA: 0
TROUBLE SWALLOWING: 0
PALPITATIONS: 0
CONSTIPATION: 0
MYALGIAS: 0
LOSS OF SENSATION IN FEET: 0
DECREASED CONCENTRATION: 0
ADENOPATHY: 0
CHEST TIGHTNESS: 0
DIARRHEA: 0
NECK STIFFNESS: 0
DIZZINESS: 0
DYSURIA: 0
OCCASIONAL FEELINGS OF UNSTEADINESS: 0
SINUS PRESSURE: 0
POLYDIPSIA: 0
RHINORRHEA: 0
COLOR CHANGE: 0
CONSTITUTIONAL NEGATIVE: 1
BLOOD IN STOOL: 0
ABDOMINAL DISTENTION: 0
ABDOMINAL PAIN: 0
ACTIVITY CHANGE: 0
SINUS PAIN: 0
FEVER: 0
SHORTNESS OF BREATH: 0
FLANK PAIN: 0
HEADACHES: 0
SORE THROAT: 0
DEPRESSION: 0
AGITATION: 0
PHOTOPHOBIA: 0

## 2025-03-21 NOTE — PATIENT INSTRUCTIONS
Follow up in 4 months    Continue current medications and therapy for chronic medical conditions.    Patient was advised importance of proper diet/nutrition in addition adequate hydration. Patient was encouraged moderate exercise program to include 30 minutes daily for 5 days of the week or 150 minutes weekly. Patient will follow-up with us as scheduled.    Start econazole 1% cream twice daily application    Review hospitalization labs and diagnostic test results    Review posthospitalization medications and discharge instructions

## 2025-03-21 NOTE — PROGRESS NOTES
"Subjective   Patient ID: Therese Davison is a 74 y.o. female who presents for Hospital Follow-up.    Patient states was seeing at [facility] on 03/12/2025 and admitted until 03/14/2025. Patient was diagnose with Pneumonia of left lung and asthma exacerbation . Patient states still feeling tired and no have energy.    Patient denies any other symptoms or concerns today.         Review of Systems   Constitutional: Negative.  Negative for activity change, appetite change, fatigue and fever.   HENT:  Negative for congestion, dental problem, ear discharge, ear pain, mouth sores, rhinorrhea, sinus pressure, sinus pain, sore throat, tinnitus and trouble swallowing.    Eyes:  Negative for photophobia, pain and visual disturbance.   Respiratory:  Positive for cough. Negative for chest tightness, shortness of breath and stridor.    Cardiovascular:  Negative for chest pain and palpitations.   Gastrointestinal:  Negative for abdominal distention, abdominal pain, blood in stool, constipation, diarrhea and rectal pain.   Endocrine: Negative for cold intolerance, heat intolerance, polydipsia, polyphagia and polyuria.   Genitourinary:  Negative for dysuria, flank pain, hematuria and urgency.   Musculoskeletal:  Negative for arthralgias, gait problem, myalgias and neck stiffness.   Skin:  Negative for color change and rash.   Allergic/Immunologic: Negative for environmental allergies and food allergies.   Neurological:  Negative for dizziness, seizures, syncope, speech difficulty and headaches.   Hematological:  Negative for adenopathy.   Psychiatric/Behavioral:  Negative for agitation, confusion, decreased concentration, dysphoric mood and sleep disturbance. The patient is nervous/anxious.        Objective   /80 (BP Location: Right arm, Patient Position: Sitting, BP Cuff Size: Adult)   Pulse 68   Temp 36.2 °C (97.2 °F) (Skin)   Resp 19   Ht 1.651 m (5' 5\")   Wt 101 kg (222 lb)   SpO2 98%   BMI 36.94 kg/m² "     Physical Exam  Vitals reviewed.   Constitutional:       General: She is not in acute distress.     Appearance: She is obese. She is not ill-appearing or diaphoretic.   HENT:      Head: Normocephalic.      Right Ear: Tympanic membrane and external ear normal.      Left Ear: Tympanic membrane and external ear normal.      Nose: Nose normal. No congestion.      Mouth/Throat:      Pharynx: No posterior oropharyngeal erythema.   Eyes:      General:         Right eye: No discharge.         Left eye: No discharge.      Extraocular Movements: Extraocular movements intact.      Conjunctiva/sclera: Conjunctivae normal.      Pupils: Pupils are equal, round, and reactive to light.   Cardiovascular:      Rate and Rhythm: Normal rate and regular rhythm.      Pulses: Normal pulses.      Heart sounds: Normal heart sounds. No murmur heard.  Pulmonary:      Effort: No respiratory distress.      Breath sounds: No wheezing or rales.      Comments: Diminished breath sounds bilaterally, respiratory rate 18  Chest:      Chest wall: No tenderness.   Abdominal:      General: Bowel sounds are normal. There is distension.      Palpations: There is no mass.      Tenderness: There is no abdominal tenderness. There is no guarding.   Musculoskeletal:         General: No tenderness. Normal range of motion.      Cervical back: Normal range of motion and neck supple. No tenderness.      Right lower leg: No edema.      Left lower leg: No edema.   Skin:     General: Skin is dry.      Coloration: Skin is not jaundiced.      Findings: No bruising, erythema or rash.   Neurological:      General: No focal deficit present.      Mental Status: She is alert and oriented to person, place, and time. Mental status is at baseline.      Cranial Nerves: No cranial nerve deficit.      Sensory: No sensory deficit.      Coordination: Coordination normal.      Gait: Gait normal.   Psychiatric:         Mood and Affect: Mood normal.         Thought Content: Thought  content normal.         Judgment: Judgment normal.         Assessment/Plan   Problem List Items Addressed This Visit             ICD-10-CM    Asthma J45.909    COPD (chronic obstructive pulmonary disease) (Multi) J44.9    Severe persistent asthma, uncomplicated (Multi) J45.50     Monitored/stable after recent hospitalization         Acute respiratory failure with hypoxia (Multi) J96.01     Monitored/improved          Other Visit Diagnoses         Codes    Pneumonia of left lung due to Streptococcus pneumoniae, unspecified part of lung ([facility])    -  Primary J13    Encounter for examination following treatment at hospital     Z09    Onychomycosis     B35.1    Relevant Medications    econazole nitrate 1 % cream              Scribe Attestation  By signing my name below, I, JONNY Chiu , Scribe   attest that this documentation has been prepared under the direction and in the presence of Lonnie Medina DO.   Provider Attestation - Scribe documentation    All medical record entries made by the Scribe were at my direction and personally dictated by me. I have reviewed the chart and agree that the record accurately reflects my personal performance of the history, physical exam, discussion and plan.

## 2025-03-23 ASSESSMENT — ENCOUNTER SYMPTOMS
NERVOUS/ANXIOUS: 1
COUGH: 1

## 2025-03-28 ENCOUNTER — PATIENT OUTREACH (OUTPATIENT)
Dept: PRIMARY CARE | Facility: CLINIC | Age: 75
End: 2025-03-28
Payer: MEDICARE

## 2025-03-31 ENCOUNTER — APPOINTMENT (OUTPATIENT)
Dept: DERMATOLOGY | Facility: CLINIC | Age: 75
End: 2025-03-31
Payer: MEDICARE

## 2025-03-31 DIAGNOSIS — D48.5 NEOPLASM OF UNCERTAIN BEHAVIOR OF SKIN: ICD-10-CM

## 2025-03-31 DIAGNOSIS — L82.1 SEBORRHEIC KERATOSIS: ICD-10-CM

## 2025-03-31 DIAGNOSIS — Z12.83 SCREENING EXAM FOR SKIN CANCER: ICD-10-CM

## 2025-03-31 DIAGNOSIS — L81.4 LENTIGO: ICD-10-CM

## 2025-03-31 DIAGNOSIS — Z85.828 HISTORY OF NONMELANOMA SKIN CANCER: ICD-10-CM

## 2025-03-31 DIAGNOSIS — D22.9 NEVUS: Primary | ICD-10-CM

## 2025-03-31 PROCEDURE — 1036F TOBACCO NON-USER: CPT | Performed by: NURSE PRACTITIONER

## 2025-03-31 PROCEDURE — 1123F ACP DISCUSS/DSCN MKR DOCD: CPT | Performed by: NURSE PRACTITIONER

## 2025-03-31 PROCEDURE — 1159F MED LIST DOCD IN RCRD: CPT | Performed by: NURSE PRACTITIONER

## 2025-03-31 PROCEDURE — 99213 OFFICE O/P EST LOW 20 MIN: CPT | Performed by: NURSE PRACTITIONER

## 2025-03-31 NOTE — PROGRESS NOTES
Subjective     Therese Davison is a 74 y.o. female who presents for the following: Skin Check.   Established patient in for yearly full body skin exam.      Review of Systems:  No other skin or systemic complaints other than what is documented elsewhere in the note.    The following portions of the chart were reviewed this encounter and updated as appropriate:       Skin Cancer History  BCC- mid chest-08/2021  BCC- left medial frontal scalp-2020  BCC- Right medial frontal scalp-2020  BCC- Nasal Philtrum -2020    Specialty Problems          Dermatology Problems    Melanocytic nevi of face    Basal cell carcinoma (BCC) of overlapping sites of skin     Last Assessment & Plan: Formatting of this note might be different from the original. Assessment: removed from chest, lip,forehead,monitored per PCP         Actinic keratosis    Basal cell carcinoma of skin of other part of trunk    Basal cell carcinoma of skin of other parts of face    Basal cell carcinoma of skin of scalp and neck    Hemangioma of skin and subcutaneous tissue    Melanocytic nevi of lower limb, including hip    Melanocytic nevi of other parts of face    Melanocytic nevi of trunk    Melanocytic nevi of unspecified upper limb, including shoulder    Neoplasm of uncertain behavior of skin    Other melanin hyperpigmentation    Other seborrheic keratosis    Personal history of other malignant neoplasm of skin    Scar condition and fibrosis of skin     Past Medical History:  Therese Davison  has a past medical history of Age-related osteoporosis without current pathological fracture, Body mass index (BMI) 34.0-34.9, adult (08/29/2022), Dorsalgia, unspecified, Encounter for follow-up examination after completed treatment for malignant neoplasm (06/08/2021), Encounter for general adult medical examination without abnormal findings (05/03/2022), Encounter for immunization, Encounter for screening for osteoporosis (03/25/2021), Flushing (11/08/2020),  Hyperlipidemia, unspecified (03/18/2021), Impacted cerumen, bilateral (02/22/2022), Impacted cerumen, bilateral (02/22/2022), Impacted cerumen, right ear (11/09/2021), Long term (current) use of aromatase inhibitors (05/06/2022), Long term (current) use of aromatase inhibitors (06/08/2021), Mammographic calcification found on diagnostic imaging of breast (06/19/2019), Old myocardial infarction (07/10/2019), Other specified disorders of eye and adnexa (07/21/2022), Other specified postprocedural states (11/08/2020), Personal history of irradiation (11/08/2020), Personal history of malignant neoplasm of breast (05/06/2022), Personal history of other diseases of the female genital tract (11/08/2020), Personal history of other diseases of the nervous system and sense organs (11/23/2021), Personal history of other diseases of the respiratory system (10/16/2019), Personal history of other diseases of the respiratory system (10/03/2022), Personal history of other drug therapy (09/23/2021), Personal history of other specified conditions (11/08/2020), Personal history of other specified conditions (07/21/2022), Personal history of other specified conditions (10/03/2022), Personal history of other specified conditions (03/18/2021), Personal history of other specified conditions (09/10/2022), Personal history of other specified conditions (09/10/2022), Polyosteoarthritis, unspecified (03/29/2021), Unspecified chronic otitis externa, unspecified ear (02/22/2022), Unspecified chronic otitis externa, unspecified ear (02/22/2022), and Unspecified symptoms and signs involving the genitourinary system (09/08/2022).    Past Surgical History:  Therese Davison  has a past surgical history that includes Other surgical history (06/19/2019); Tonsillectomy (10/16/2019); Other surgical history (11/08/2020); Other surgical history (05/03/2022); Other surgical history (05/03/2022); Other surgical history (05/06/2022); Tubal ligation  (11/08/2020); Other surgical history (11/08/2020); Other surgical history (11/08/2020); and Breast lumpectomy (Left, 2019).    Family History:  Patient family history includes Arthritis in an other family member; Breast cancer (age of onset: 64) in her sister; Breast cancer (age of onset: 68) in her mother and another family member; Cancer in her mother and another family member; Emphysema in her father; Kidney disease in her mother; environmental allergies in her mother.    Social History:  Therese Davison  reports that she has never smoked. She has never used smokeless tobacco. She reports that she does not currently use alcohol. She reports that she does not use drugs.    Allergies:  Aspirin, Dog dander, Ibuprofen, Naproxen, Other, Wool, Morphine, Naproxen sodium, and Lanolin    Current Medications / CAM's:    Current Outpatient Medications:     albuterol (ProAir RespiClick) 90 mcg/actuation aerosol powdr breath activated inhaler, Inhale., Disp: , Rfl:     albuterol 2.5 mg /3 mL (0.083 %) nebulizer solution, Take 3 mL (2.5 mg) by nebulization every 4 hours if needed., Disp: , Rfl:     ammonium lactate (Lac-Hydrin) 12 % lotion, Apply topically once daily., Disp: 225 g, Rfl: 3    apixaban (Eliquis) 5 mg tablet, Take 1 tablet (5 mg) by mouth twice a day., Disp: , Rfl:     Bacillus coagulans (Probiotic, B. coagulans,) 1 billion cell tablet,chewable, Chew., Disp: , Rfl:     BACILLUS SUBTILIS-INULIN ORAL, Take by mouth., Disp: , Rfl:     econazole nitrate 1 % cream, Apply topically once daily., Disp: 85 g, Rfl: 1    EPINEPHrine 0.3 mg/0.3 mL injection syringe, Inject 0.3 mL intramuscularly as needed., Disp: , Rfl:     ergocalciferol, vitamin D2, (VITAMIN D2 ORAL), Take by mouth., Disp: , Rfl:     fluticasone (Flonase) 50 mcg/actuation nasal spray, Administer 1 spray into affected nostril(s) 2 times a day., Disp: , Rfl:     fluticasone propion-salmeteroL (Advair HFA) 45-21 mcg/actuation inhaler, Inhale., Disp: ,  Rfl:     metoprolol succinate XL (Toprol-XL) 25 mg 24 hr tablet, Take 1 tablet (25 mg) by mouth once daily., Disp: , Rfl:     tezepelumab-ekko (Tezspire) SubQ syringe, Inject 210 mg under the skin every 28 (twenty-eight) days., Disp: , Rfl:     valACYclovir (Valtrex) 1 gram tablet, Take 2 tablets (2,000 mg) by mouth. At the onset of symptoms & repeat in 12 hours., Disp: , Rfl:     Current Facility-Administered Medications:     triamcinolone acetonide (Kenalog-40) injection 80 mg, 80 mg, intramuscular, Once, Lonnie Medina,     triamcinolone acetonide (Kenalog-80) injection 80 mg, 80 mg, intra-articular, Once, Lonnie Medina,      Objective   Well appearing patient in no apparent distress; mood and affect are within normal limits.      Assessment/Plan   1. Nevus  Uniform pigmented macule(s)/papule(s) with reassuring findings on dermoscopy    -Discussed nature of condition  -Reassurance, benign-appearing features on examination today  -Recommend continued observation    2. Lentigo  Tan macules    -Benign appearing on exam  -Reassurance, recommend observation    3. Seborrheic keratosis  Stuck on, waxy macule(s)/papule(s)/plaque(s) with comedo-like openings and milia like cysts    -Discussed nature of condition  -Reassurance, recommend continued observation    4. History of nonmelanoma skin cancer  Personal History of Non-Melanoma Skin Cancer  -Well healed scar(s) with no evidence of recurrence  -Discussed the need for annual or semi-annual skin examinations and to return sooner if any new or changing lesions are noticed. Patient verbalizes understanding    5. Screening exam for skin cancer    6. Neoplasm of uncertain behavior of skin  Anterior Mid Neck              Lesion biopsy  Type of biopsy: tangential    Informed consent: discussed and consent obtained    Timeout: patient name, date of birth, surgical site, and procedure verified    Procedure prep:  Patient was prepped and draped  Anesthesia: the lesion  was anesthetized in a standard fashion    Anesthetic:  1% lidocaine w/ epinephrine 1-100,000 local infiltration  Instrument used: DermaBlade    Hemostasis achieved with: aluminum chloride    Outcome: patient tolerated procedure well    Post-procedure details: sterile dressing applied and wound care instructions given    Dressing type: petrolatum and bandage      Specimen 1 - Dermatopathology- DERM LAB  Differential Diagnosis: NMSC vs other  Check Margins Yes/No?:    Comments:    Dermpath Lab: Routine Histopathology (formalin-fixed tissue)

## 2025-04-02 LAB
LABORATORY COMMENT REPORT: NORMAL
PATH REPORT.FINAL DX SPEC: NORMAL
PATH REPORT.GROSS SPEC: NORMAL
PATH REPORT.MICROSCOPIC SPEC OTHER STN: NORMAL
PATH REPORT.RELEVANT HX SPEC: NORMAL
PATH REPORT.TOTAL CANCER: NORMAL

## 2025-04-07 DIAGNOSIS — C44.41 BASAL CELL CARCINOMA (BCC) OF NECK: ICD-10-CM

## 2025-04-09 ENCOUNTER — PATIENT OUTREACH (OUTPATIENT)
Dept: PRIMARY CARE | Facility: CLINIC | Age: 75
End: 2025-04-09
Payer: MEDICARE

## 2025-05-02 ENCOUNTER — APPOINTMENT (OUTPATIENT)
Dept: PRIMARY CARE | Facility: CLINIC | Age: 75
End: 2025-05-02
Payer: MEDICARE

## 2025-05-02 DIAGNOSIS — J30.9 ALLERGIC RHINITIS, UNSPECIFIED SEASONALITY, UNSPECIFIED TRIGGER: ICD-10-CM

## 2025-05-02 PROCEDURE — 96372 THER/PROPH/DIAG INJ SC/IM: CPT | Performed by: FAMILY MEDICINE

## 2025-05-02 RX ORDER — TRIAMCINOLONE ACETONIDE 40 MG/ML
80 INJECTION, SUSPENSION INTRA-ARTICULAR; INTRAMUSCULAR ONCE
Status: COMPLETED | OUTPATIENT
Start: 2025-05-02 | End: 2025-05-02

## 2025-05-02 RX ADMIN — TRIAMCINOLONE ACETONIDE 80 MG: 40 INJECTION, SUSPENSION INTRA-ARTICULAR; INTRAMUSCULAR at 10:05

## 2025-05-10 DIAGNOSIS — Z12.83 SKIN CANCER SCREENING: ICD-10-CM

## 2025-05-12 RX ORDER — AMMONIUM LACTATE 12 G/100G
LOTION TOPICAL DAILY
Qty: 225 G | Refills: 3 | Status: SHIPPED | OUTPATIENT
Start: 2025-05-12

## 2025-05-14 ENCOUNTER — HOSPITAL ENCOUNTER (OUTPATIENT)
Dept: RADIOLOGY | Facility: CLINIC | Age: 75
Discharge: HOME | End: 2025-05-14
Payer: MEDICARE

## 2025-05-14 DIAGNOSIS — Z08 ENCOUNTER FOR FOLLOW-UP SURVEILLANCE OF BREAST CANCER: ICD-10-CM

## 2025-05-14 DIAGNOSIS — Z85.3 ENCOUNTER FOR FOLLOW-UP SURVEILLANCE OF BREAST CANCER: ICD-10-CM

## 2025-05-14 PROCEDURE — 77063 BREAST TOMOSYNTHESIS BI: CPT

## 2025-05-28 ENCOUNTER — PATIENT OUTREACH (OUTPATIENT)
Dept: PRIMARY CARE | Facility: CLINIC | Age: 75
End: 2025-05-28
Payer: MEDICARE

## 2025-06-13 ENCOUNTER — APPOINTMENT (OUTPATIENT)
Dept: DERMATOLOGY | Facility: CLINIC | Age: 75
End: 2025-06-13
Payer: MEDICARE

## 2025-06-13 VITALS — SYSTOLIC BLOOD PRESSURE: 124 MMHG | DIASTOLIC BLOOD PRESSURE: 82 MMHG | HEART RATE: 82 BPM

## 2025-06-13 DIAGNOSIS — C44.41 BASAL CELL CARCINOMA (BCC) OF SKIN OF NECK: ICD-10-CM

## 2025-06-13 NOTE — PROGRESS NOTES
Mohs Surgery Operative Note    Date of Surgery:  6/13/2025  Surgeon:  Luis Enrique Latham MD PhD  Office Location:  [address] [facility]  [address] [place] [address]  [facility] [address]  Dept: [phone]  Dept Fax: [phone]  Loc: [phone]  Referring Provider: Nahomy Loving, APRN-CNP  [address] [place] [id number]      Assessment/Plan   Pre-procedure:   Obtained informed consent: written from patient  The surgical site was identified and confirmed with the patient.     Intra-operative:   Audible time out called at : 1:51 PM 06/13/25  by: Abhijeet Mercado MA   Verified patient name, birthdate, site, specimen bottle label & requisition.    The planned procedure(s) was again reviewed with the patient. The risks of bleeding, infection, nerve damage and scarring were reviewed. Written authorization was obtained. The patient identity, surgical site, and planned procedure(s) were verified. The provider acted as both surgeon and pathologist.     BASAL CELL CARCINOMA (BCC) OF SKIN OF NECK  Neck - Anterior  Mohs surgery    Consent obtained: written    Universal Protocol:  Procedure explained and questions answered to patient or proxy's satisfaction: Yes    Test results available and properly labeled: Yes    Pathology report reviewed: Yes    External notes reviewed: Yes    Photo or diagram used for site identification: Yes    Site/side marked: Yes    Slide independently reviewed by Mohs surgeon: Yes    Immediately prior to procedure a time out was called: Yes    Patient identity confirmed: verbally with patient  Preparation: Patient was prepped and draped in usual sterile fashion      Anticoagulation:  Is the patient taking prescription anticoagulant and/or aspirin prescribed/recommended by a physician? Yes    Was the anticoagulation regimen changed prior to Mohs? No      Anesthesia:  Anesthesia method: local infiltration  Local anesthetic: lidocaine 1% WITH epi    Procedure Details:  Case  ID Number: [id number]  Biopsy accession number: [id number]  Date of biopsy: 3/31/2025  Frozen section biopsy performed: No    Specimen debulked: No    Pre-Op diagnosis: basal cell carcinoma  BCC subtype: nodular  Surgical site (from skin exam): Neck - Anterior  Pre-operative length (cm): 1.5  Pre-operative width (cm): 1.2  Indications for Mohs surgery: anatomic location where tissue conservation is critical  Previously treated? Yes    Previous treatment type: cryotherapy    Micrographic Surgery Details:  Post-operative length (cm): 3.7  Post-operative width (cm): 2.2  Number of Mohs stages: 1    Stage 1     Comments: The patient was brought into the operating room and placed in the procedure chair in the appropriate position.  The area positive by previous biopsy was identified and confirmed with the patient. The area of clinically obvious tumor was debulked using a curette and/or scalpel as needed. An incision was made following the Mohs approach through the skin. The specimen was taken to the lab, divided into 2 piece(s) and appropriately chromacoded and processed.                 Tumor features identified on Mohs section: no tumor identified    Depth of defect: subcutaneous fat    Patient tolerance of procedure: tolerated well, no immediate complications    Reconstruction:  Was the defect reconstructed? Yes    Was reconstruction performed by the same Mohs surgeon? Yes    Setting of reconstruction: outpatient office  When was reconstruction performed? same day  Type of reconstruction: linear  Linear reconstruction: complex  Length of linear repair (cm): 5  Subcutaneous Layers (Deep Stitches)   Suture size:  3-0  Stitches:  Buried vertical mattress  Fine/surface layer approximation (top stitches)   Epidermal/Superficial suture size:  5-0  Epidermal/Superficial suture type:  Prolene  Stitches: simple running    Hemostasis achieved with: electrodesiccation  Outcome: patient tolerated procedure well with no complications     Post-procedure details: sterile dressing applied and wound care instructions given    Dressing type: pressure dressing      Complex Linear Repair - Wide Undermining:  Given the location and size of the defect, it was determined that a complex layered linear closure was required to restore normal anatomy and function. The repair was considered complex because extensive undermining was required and performed. The amount of undermining performed was greater than the maximum width of the defect as measured perpendicular to the closure line along at least one entire edge of the defect. Standing cutaneous cones were removed using Burow's triangles. The wound edges were brought into close approximation with buried vertical mattress sutures. The remainder of the wound was then closed with epidermal top sutures.        The final repair measured 5.0 cm    Wound care was discussed, and the patient was given written post-operative wound care instructions.      The patient will follow up with Luis Enrique Latham MD PhD as needed for any post operative problems or concerns, and will follow up with their primary dermatologist as scheduled.       Abhijeet GONZALEZ MA  am scribing for, and in the presence of Luis Enrique Latham MD PhD    ILuis Enrique MD, PhD, personally performed the services described in the documentation as scribed by Abhijeet Mercado in my presence, and confirm it is both accurate and complete.

## 2025-06-13 NOTE — PROGRESS NOTES
Office Visit Note  Date: 6/13/2025  Surgeon:  Luis Enrique Latham MD PhD  Office Location:  [address] [facility]  [address]  [facility] [address]  [facility] [address]  Dept: [phone]  Dept Fax: [phone]  Loc: [phone]  Referring Provider: Nahomy Loving, REBEKAH-BEV  [address] [facility] [address]  [place] [address]    Subjective   Therese Davison is a 74 y.o. female who presents for the following: MOHS Surgery    According to the patient, the lesion has been present for approximately greater than 1 year at the time of diagnosis.  The lesion is painful.  The lesion has not been treated previously.    The patient does not have a pacemaker / defibrillator.  The patient does not have a heart valve / joint replacement.    The patient is on blood thinners.  The patient does not have a history of hepatitis B or C.  The patient does not have a history of HIV.  The patient does have a history of immunosuppression (e.g. organ transplantation, malignancy, medications)    Review of Systems:  No other skin or systemic complaints other than what is documented elsewhere in the note.    MEDICAL HISTORY: clinically relevant history including significant past medical history, medications and allergies was reviewed and documented in Epic.    Objective   Well appearing patient in no apparent distress; mood and affect are within normal limits.  Vital signs: See record.  Noted on the   Neck - Anterior  Is a 1.5 x 1.2 cm scar      The patient confirmed the identified site.    Discussion:  The nature of the diagnosis was explained. The lesion is a skin cancer.  It has a risk of local growth and distant spread. The condition is associated with sun exposure.  Warning signs of non-melanoma skin cancer discussed. Patient was instructed to perform monthly self skin examination.  We recommended that the patient have regular full skin exams given an increased risk of subsequent skin cancers. The patient was instructed to use sun  protective behaviors including use of broad spectrum sunscreens and sun protective clothing to reduce risk of skin cancers.      Risks, benefits, side effects of Mohs surgery were discussed with patient and the patient voiced understanding.  It was explained that even though the cure rate of Mohs is very high it is not 100%. Risks of surgery including but not limited to bleeding, infection, numbness, nerve damage, and scar were reviewed.  Discussion included wound care requirements, activity restrictions, likely scar outcome and time to heal.     After Mohs surgery, the defect may need to be repaired surgically and the scar may be longer than the original lesion.  Reconstruction options, risks, and benefits were reviewed including second intention healing, linear repair (4-1 ratio was explained), local flaps, skin grafts, cartilage grafts and interpolation flaps (the need for multiple surgeries was explained). Possible outcomes were reviewed including likely scar appearance, failure of flap survival, infection, bleeding and the need for revision surgery.     The pathology was reviewed, the photograph was reviewed, and the referring physician's note was reviewed.    Patient elected for Mohs surgery.     IAbhijeet MA  am scribing for, and in the presence of Luis Enrique Latham MD PhD    ILuis Enrique MD, PhD, personally performed the services described in the documentation as scribed by Abhijeet Mercado in my presence, and confirm it is both accurate and complete.

## 2025-06-19 ENCOUNTER — APPOINTMENT (OUTPATIENT)
Dept: DERMATOLOGY | Facility: CLINIC | Age: 75
End: 2025-06-19
Payer: MEDICARE

## 2025-06-19 DIAGNOSIS — Z48.02 ENCOUNTER FOR REMOVAL OF SUTURES: ICD-10-CM

## 2025-06-19 PROCEDURE — 99024 POSTOP FOLLOW-UP VISIT: CPT | Performed by: NURSE PRACTITIONER

## 2025-06-19 NOTE — PROGRESS NOTES
Subjective     Therese Davison is a 74 y.o. female who presents for the following: Nurse Visit (S/P 6 days suture removal on neck- anterior. ).          Review of Systems:  No other skin or systemic complaints other than what is documented elsewhere in the note.    The following portions of the chart were reviewed this encounter and updated as appropriate:         Skin Cancer History  Biopsy Log Book  Biopsied Type Location Status   3/31/25 BCC Anterior Mid Neck Treatment Complete - Mohs  6/13/25       Additional History      Specialty Problems          Dermatology Problems    Melanocytic nevi of face    Basal cell carcinoma (BCC) of overlapping sites of skin    Last Assessment & Plan: Formatting of this note might be different from the original. Assessment: removed from chest, lip,forehead,monitored per PCP         Actinic keratosis    Basal cell carcinoma of skin of other part of trunk    Basal cell carcinoma of skin of other parts of face    Basal cell carcinoma of skin of scalp and neck    Hemangioma of skin and subcutaneous tissue    Melanocytic nevi of lower limb, including hip    Melanocytic nevi of other parts of face    Melanocytic nevi of trunk    Melanocytic nevi of unspecified upper limb, including shoulder    Neoplasm of uncertain behavior of skin    Other melanin hyperpigmentation    Other seborrheic keratosis    Personal history of other malignant neoplasm of skin    Scar condition and fibrosis of skin     Past Medical History:  Therese Davison  has a past medical history of Age-related osteoporosis without current pathological fracture, Asthma, Body mass index (BMI) 34.0-34.9, adult (08/29/2022), Breast cancer (2020), Dorsalgia, unspecified, Encounter for follow-up examination after completed treatment for malignant neoplasm (06/08/2021), Encounter for general adult medical examination without abnormal findings (05/03/2022), Encounter for immunization, Encounter for screening for osteoporosis  (03/25/2021), Flushing (11/08/2020), Hyperlipidemia, unspecified (03/18/2021), Impacted cerumen, bilateral (02/22/2022), Impacted cerumen, bilateral (02/22/2022), Impacted cerumen, right ear (11/09/2021), Long term (current) use of aromatase inhibitors (05/06/2022), Long term (current) use of aromatase inhibitors (06/08/2021), Mammographic calcification found on diagnostic imaging of breast (06/19/2019), Old myocardial infarction (07/10/2019), Other specified disorders of eye and adnexa (07/21/2022), Other specified postprocedural states (11/08/2020), Personal history of irradiation (11/08/2020), Personal history of malignant neoplasm of breast (05/06/2022), Personal history of other diseases of the female genital tract (11/08/2020), Personal history of other diseases of the nervous system and sense organs (11/23/2021), Personal history of other diseases of the respiratory system (10/16/2019), Personal history of other diseases of the respiratory system (10/03/2022), Personal history of other drug therapy (09/23/2021), Personal history of other specified conditions (11/08/2020), Personal history of other specified conditions (07/21/2022), Personal history of other specified conditions (10/03/2022), Personal history of other specified conditions (03/18/2021), Personal history of other specified conditions (09/10/2022), Personal history of other specified conditions (09/10/2022), Polyosteoarthritis, unspecified (03/29/2021), Unspecified chronic otitis externa, unspecified ear (02/22/2022), Unspecified chronic otitis externa, unspecified ear (02/22/2022), and Unspecified symptoms and signs involving the genitourinary system (09/08/2022).    Past Surgical History:  Therese Davison  has a past surgical history that includes Other surgical history (06/19/2019); Tonsillectomy (10/16/2019); Other surgical history (11/08/2020); Other surgical history (05/03/2022); Other surgical history (05/03/2022); Other surgical  history (05/06/2022); Tubal ligation (11/08/2020); Other surgical history (11/08/2020); Other surgical history (11/08/2020); Breast lumpectomy (Left, 2019); and Oophorectomy (2021).    Family History:  Patient family history includes Arthritis in an other family member; Breast cancer (age of onset: 64) in her sister; Breast cancer (age of onset: 68) in her mother and another family member; Cancer in her mother and another family member; Emphysema in her father; Kidney disease in her mother; environmental allergies in her mother.    Social History:  Therese Davison  reports that she has never smoked. She has never used smokeless tobacco. She reports that she does not currently use alcohol. She reports that she does not use drugs.    Allergies:  Aspirin, Dog dander, Ibuprofen, Naproxen, Other, Wool, Morphine, Naproxen sodium, and Lanolin    Current Medications / CAM's:  Current Medications[1]     Objective   Well appearing patient in no apparent distress; mood and affect are within normal limits.      Assessment/Plan   Skin Exam  1. ENCOUNTER FOR REMOVAL OF SUTURES  Anterior Neck  S/P 6 days suture removal on neck- anterior. Site clean, dry, and intact. Suture removed without complication. Pt tolerated suture removal well. Vaseline applied to site.          [1]   Current Outpatient Medications:     albuterol (ProAir RespiClick) 90 mcg/actuation aerosol powdr breath activated inhaler, Inhale., Disp: , Rfl:     albuterol 2.5 mg /3 mL (0.083 %) nebulizer solution, Take 3 mL (2.5 mg) by nebulization every 4 hours if needed., Disp: , Rfl:     ammonium lactate (Lac-Hydrin) 12 % lotion, Apply topically once daily., Disp: 225 g, Rfl: 3    apixaban (Eliquis) 5 mg tablet, Take 1 tablet (5 mg) by mouth twice a day., Disp: , Rfl:     Bacillus coagulans (Probiotic, B. coagulans,) 1 billion cell tablet,chewable, Chew., Disp: , Rfl:     BACILLUS SUBTILIS-INULIN ORAL, Take by mouth., Disp: , Rfl:     econazole nitrate 1 % cream,  Apply topically once daily., Disp: 85 g, Rfl: 1    EPINEPHrine 0.3 mg/0.3 mL injection syringe, Inject 0.3 mL intramuscularly as needed., Disp: , Rfl:     ergocalciferol, vitamin D2, (VITAMIN D2 ORAL), Take by mouth., Disp: , Rfl:     fluticasone (Flonase) 50 mcg/actuation nasal spray, Administer 1 spray into affected nostril(s) 2 times a day., Disp: , Rfl:     fluticasone propion-salmeteroL (Advair HFA) 45-21 mcg/actuation inhaler, Inhale., Disp: , Rfl:     metoprolol succinate XL (Toprol-XL) 25 mg 24 hr tablet, Take 1 tablet (25 mg) by mouth once daily., Disp: , Rfl:     tezepelumab-ekko (Tezspire) SubQ syringe, Inject 210 mg under the skin every 28 (twenty-eight) days., Disp: , Rfl:     valACYclovir (Valtrex) 1 gram tablet, Take 2 tablets (2,000 mg) by mouth. At the onset of symptoms & repeat in 12 hours., Disp: , Rfl:     Current Facility-Administered Medications:     triamcinolone acetonide (Kenalog-40) injection 80 mg, 80 mg, intramuscular, Once, Lonnie Medina DO    triamcinolone acetonide (Kenalog-80) injection 80 mg, 80 mg, intra-articular, Once, Lonnie Medina DO

## 2025-06-19 NOTE — Clinical Note
S/P 6 days suture removal on neck- anterior. Site clean, dry, and intact. Suture removed without complication. Pt tolerated suture removal well. Vaseline applied to site.

## 2025-06-19 NOTE — PROGRESS NOTES
Subjective     Therese Davison is a 74 y.o. female who presents for the following: Nurse Visit (S/P 6 days suture removal on neck- anterior. ).          Review of Systems:  No other skin or systemic complaints other than what is documented elsewhere in the note.    The following portions of the chart were reviewed this encounter and updated as appropriate:          Skin Cancer History  Biopsy Log Book  Biopsied Type Location Status   3/31/25 BCC Anterior Mid Neck Treatment Complete - Mohs  6/13/25       Additional History      Specialty Problems          Dermatology Problems    Melanocytic nevi of face    Basal cell carcinoma (BCC) of overlapping sites of skin    Last Assessment & Plan: Formatting of this note might be different from the original. Assessment: removed from chest, lip,forehead,monitored per PCP         Actinic keratosis    Basal cell carcinoma of skin of other part of trunk    Basal cell carcinoma of skin of other parts of face    Basal cell carcinoma of skin of scalp and neck    Hemangioma of skin and subcutaneous tissue    Melanocytic nevi of lower limb, including hip    Melanocytic nevi of other parts of face    Melanocytic nevi of trunk    Melanocytic nevi of unspecified upper limb, including shoulder    Neoplasm of uncertain behavior of skin    Other melanin hyperpigmentation    Other seborrheic keratosis    Personal history of other malignant neoplasm of skin    Scar condition and fibrosis of skin        Objective   Well appearing patient in no apparent distress; mood and affect are within normal limits.      Assessment/Plan   Skin Exam  1. ENCOUNTER FOR REMOVAL OF SUTURES  Anterior Neck  S/P 6 days suture removal on neck- anterior. Site clean, dry, and intact. Suture removed without complication. Pt tolerated suture removal well. Vaseline applied to site.

## 2025-07-07 ENCOUNTER — TELEPHONE (OUTPATIENT)
Dept: PRIMARY CARE | Facility: CLINIC | Age: 75
End: 2025-07-07
Payer: MEDICARE

## 2025-07-07 DIAGNOSIS — R53.82 CHRONIC FATIGUE: ICD-10-CM

## 2025-07-07 DIAGNOSIS — E78.2 MIXED HYPERLIPIDEMIA: ICD-10-CM

## 2025-07-07 DIAGNOSIS — E53.8 VITAMIN B12 DEFICIENCY: ICD-10-CM

## 2025-07-07 DIAGNOSIS — E55.9 VITAMIN D DEFICIENCY: ICD-10-CM

## 2025-07-07 DIAGNOSIS — D50.8 IRON DEFICIENCY ANEMIA SECONDARY TO INADEQUATE DIETARY IRON INTAKE: ICD-10-CM

## 2025-07-07 NOTE — TELEPHONE ENCOUNTER
Dr Medina pt    Pt phoned office and is requesting a full set of labs. She has an upcoming apt and wants these in before because she is feeling tired.

## 2025-07-11 ENCOUNTER — APPOINTMENT (OUTPATIENT)
Dept: PRIMARY CARE | Facility: CLINIC | Age: 75
End: 2025-07-11
Payer: MEDICARE

## 2025-07-11 VITALS
WEIGHT: 221.8 LBS | HEART RATE: 67 BPM | TEMPERATURE: 98.1 F | DIASTOLIC BLOOD PRESSURE: 84 MMHG | SYSTOLIC BLOOD PRESSURE: 128 MMHG | BODY MASS INDEX: 36.96 KG/M2 | HEIGHT: 65 IN | OXYGEN SATURATION: 94 %

## 2025-07-11 DIAGNOSIS — E78.2 MIXED HYPERLIPIDEMIA: ICD-10-CM

## 2025-07-11 DIAGNOSIS — M15.0 PRIMARY OSTEOARTHRITIS INVOLVING MULTIPLE JOINTS: ICD-10-CM

## 2025-07-11 DIAGNOSIS — R10.31 RIGHT LOWER QUADRANT ABDOMINAL PAIN: Primary | ICD-10-CM

## 2025-07-11 DIAGNOSIS — J30.2 SEASONAL ALLERGIES: ICD-10-CM

## 2025-07-11 DIAGNOSIS — J30.1 NON-SEASONAL ALLERGIC RHINITIS DUE TO POLLEN: ICD-10-CM

## 2025-07-11 DIAGNOSIS — J45.40 MODERATE PERSISTENT ASTHMA WITHOUT COMPLICATION (HHS-HCC): ICD-10-CM

## 2025-07-11 PROCEDURE — 99214 OFFICE O/P EST MOD 30 MIN: CPT | Performed by: FAMILY MEDICINE

## 2025-07-11 PROCEDURE — G2211 COMPLEX E/M VISIT ADD ON: HCPCS | Performed by: FAMILY MEDICINE

## 2025-07-11 NOTE — PROGRESS NOTES
Subjective   Patient ID: Therese Davison is a 74 y.o. female who presents for Follow-up (4 month follow up. States she is still feeling fatigue from covid  last month.), Results (Discuss results of blood work.), Cerumen Impaction (Patient would like to check right ear. She states it crackles, like if ear was blocked. Ongoing x 3 months. Tried nothing.), and Bloated (Patient would like to discuss upper abdominal bloating. She states it occurs in the evening. Ongoing x 6 months, off and on.).  History of Present Illness  The patient is a 74-year-old  female who presents for a 4-month follow-up. She had COVID-19 last month and is experiencing fatigue. She would like to discuss laboratory results and have her ears checked due to a possible blockage or cerumen impaction, particularly on the right side. Additionally, she wants to discuss abdominal bloating, which occurs in the evening and has been ongoing for the past 6 months.    She reports feeling fatigued and lacking energy. She does not experience any respiratory symptoms such as cough. She contracted COVID-19 on 06/14/2025, following a Mohs procedure on 06/13/2025. The next day, she experienced sudden diarrhea and fainted in the bathroom. Although EMTs were called, she recovered by the time they arrived and chose to stay home. It took her a few days to recover from this episode. She did not use her albuterol or ProAir nebulizer during her COVID-19 infection. She wakes up every 2 to 3 hours at night to use the bathroom. She has previously received B12 injections and found them beneficial. She takes a multivitamin and is interested in increasing her energy levels. She does not snore heavily.    She experiences abdominal bloating in the evenings, which has been ongoing for the past 6 months. She describes occasional bloating and discomfort but does not experience nausea. She has to be cautious about her diet due to almost uncontrollable bowel movements.  She has stopped drinking coffee and noticed that adding blueberries to her cereal for 3 or 4 days caused discomfort. She can tolerate contrast medium and has used it before. She does not have issues with gas. She occasionally experiences rib cramps if she stretches or moves incorrectly.    She reports a crackling sound in her right ear but does not experience any pain.    She has never been diagnosed with lupus. Her joints feel good when she receives Kenalog injections, but at other times, she experiences pain even when walking.    She is not currently taking any medication for cholesterol management and has never taken statins.    She is currently on Eliquis, probiotics, fluconazole cream, Flonase nasal spray, metoprolol, monthly injections, and valacyclovir as needed for cold sores. She has an up-to-date EpiPen for emergency use.    PAST SURGICAL HISTORY:  Mohs procedure on 06/13/2025    Review of Systems   Constitutional: Negative.  Negative for activity change, appetite change, fatigue and fever.   HENT:  Negative for congestion, dental problem, ear discharge, ear pain, mouth sores, rhinorrhea, sinus pressure, sinus pain, sore throat, tinnitus and trouble swallowing.    Eyes:  Negative for photophobia, pain and visual disturbance.   Respiratory:  Negative for cough, chest tightness, shortness of breath and stridor.    Cardiovascular:  Negative for chest pain and palpitations.   Gastrointestinal:  Negative for abdominal distention, abdominal pain, blood in stool, constipation, diarrhea and rectal pain.   Endocrine: Negative for cold intolerance, heat intolerance, polydipsia, polyphagia and polyuria.   Genitourinary:  Negative for dysuria, flank pain, hematuria and urgency.   Musculoskeletal:  Negative for arthralgias, gait problem, myalgias and neck stiffness.   Skin:  Negative for color change and rash.   Allergic/Immunologic: Negative for environmental allergies and food allergies.   Neurological:  Negative for  "dizziness, seizures, syncope, speech difficulty and headaches.   Hematological:  Negative for adenopathy.   Psychiatric/Behavioral:  Negative for agitation, confusion, decreased concentration, dysphoric mood and sleep disturbance. The patient is not nervous/anxious.    The above were reviewed and noted negative except as noted in HPI and Problem List.    Objective     /84   Pulse 67   Temp 36.7 °C (98.1 °F) (Temporal)   Ht 1.651 m (5' 5\")   Wt 101 kg (221 lb 12.8 oz)   SpO2 94%   BMI 36.91 kg/m²      Physical Exam  Ears: Ears appear normal.  Gastrointestinal: Tenderness noted on both sides of the abdomen, particularly where the muscles attach to the ribs.  Constitutional: Well developed, well nourished, alert and in no acute distress   Eyes: Normal external exam. Pupils equally round and reactive to light with normal accommodation and extraocular movements intact.  Neck: Supple, no lymphadenopathy or masses.   Cardiovascular: Regular rate and rhythm, normal S1 and S2, no murmurs, gallops, or rubs. Radial pulses normal. No peripheral edema.  Pulmonary: No respiratory distress, lungs clear to auscultation bilaterally. No wheezes, rhonchi, rales.  Abdomen: soft,non tender, non distended, without masses or HSM  Skin: Warm, well perfused, normal skin turgor and color.   Neurologic: Cranial nerves II-XII grossly intact.   Psychiatric: Mood calm and affect normal  Musculoskeletal: Moving all extremities without restriction  The above were reviewed and noted negative except as noted in HPI and Problem List.    Problem List Items Addressed This Visit       Asthma    Mixed hyperlipidemia    Seasonal allergies    Osteoarthritis, multiple sites    Non-seasonal allergic rhinitis due to pollen     Other Visit Diagnoses         Right lower quadrant abdominal pain    -  Primary    Relevant Orders    CT abdomen pelvis w and wo IV contrast             Assessment & Plan  1. Fatigue.  - Reports experiencing fatigue since " her COVID-19 infection in June.  - B12 level is within the normal range at 534.  - Advised to continue taking her multivitamin.  - No B12 injection deemed necessary at this time.    2. Abdominal bloating.  - Reports abdominal bloating occurring in the evening for the past 6 months.  - Physical examination revealed tenderness on both sides of the abdomen.  - A CAT scan will be ordered to further investigate the cause of her abdominal bloating. If insurance requires an ultrasound first, that may be done instead.  - No significant issues with gas or bowel movements noted.    3. Cerumen impaction.  - Reports a sensation of blockage and crackling in her right ear.  - Examination revealed no significant findings.  - Eardrops will be prescribed for use as needed.    4. Temporomandibular joint disorder.  - Reports occasional jaw pain and crackling sounds in her ear, which may be related to TMJ disorder.  - No current pain or headaches reported.  - If daily headaches or jaw pain upon waking occur, a muscle relaxant may be considered to prevent teeth grinding at night.    5. Hypercholesterolemia.  - HDL level is satisfactory at 74.  - Advised to engage in regular exercise to maintain her HDL level.  - No current use of statins due to concerns about side effects.    Results  Labs   - SUHAIL screen: Positive, titer is low   - Vitamin B12: 534   - Folic acid: 68   - Total iron: 65   - TIBC: 337   - Iron saturation: 19   - Thyroid studies: Normal   - TSH: 3.39   - Vitamin B: 42   - HDL: 74   - White count: 8.2   - Hemoglobin: 11.9   - Hematocrit: 37.2   - Blood sugar: 95   - BUN: 15   - Creatinine: 0.68   - GFR: 91   - Sodium: 143   - Potassium: 5   - AST: 10   - ALT: 11       Lonnie Medina DO     This medical note was created with the assistance of artificial intelligence (AI) for documentation purposes. The content has been reviewed and confirmed by the healthcare provider for accuracy and completeness. Patient consented to  the use of audio recording and use of AI during their visit.

## 2025-07-12 LAB
25(OH)D3+25(OH)D2 SERPL-MCNC: 42 NG/ML (ref 30–100)
ALBUMIN SERPL-MCNC: 4.1 G/DL (ref 3.6–5.1)
ALP SERPL-CCNC: 68 U/L (ref 37–153)
ALT SERPL-CCNC: 11 U/L (ref 6–29)
ANA PAT SER IF-IMP: ABNORMAL
ANA SER QL IF: POSITIVE
ANA TITR SER IF: ABNORMAL TITER
ANION GAP SERPL CALCULATED.4IONS-SCNC: 9 MMOL/L (CALC) (ref 7–17)
AST SERPL-CCNC: 10 U/L (ref 10–35)
BASOPHILS # BLD AUTO: 66 CELLS/UL (ref 0–200)
BASOPHILS NFR BLD AUTO: 0.8 %
BILIRUB SERPL-MCNC: 0.3 MG/DL (ref 0.2–1.2)
BUN SERPL-MCNC: 15 MG/DL (ref 7–25)
CALCIUM SERPL-MCNC: 9.2 MG/DL (ref 8.6–10.4)
CENTROMERE B AB SER-ACNC: ABNORMAL AI
CHLORIDE SERPL-SCNC: 107 MMOL/L (ref 98–110)
CHOLEST SERPL-MCNC: 254 MG/DL
CHOLEST/HDLC SERPL: 3.4 (CALC)
CO2 SERPL-SCNC: 27 MMOL/L (ref 20–32)
CREAT SERPL-MCNC: 0.68 MG/DL (ref 0.6–1)
DSDNA AB SER-ACNC: 2 IU/ML
EGFRCR SERPLBLD CKD-EPI 2021: 91 ML/MIN/1.73M2
ENA JO1 AB SER IA-ACNC: ABNORMAL AI
ENA RNP AB SER-ACNC: ABNORMAL AI
ENA SCL70 AB SER IA-ACNC: ABNORMAL AI
ENA SM AB SER IA-ACNC: ABNORMAL AI
ENA SM+RNP AB SER IA-ACNC: ABNORMAL AI
ENA SS-A AB SER IA-ACNC: ABNORMAL AI
ENA SS-B AB SER IA-ACNC: ABNORMAL AI
EOSINOPHIL # BLD AUTO: 148 CELLS/UL (ref 15–500)
EOSINOPHIL NFR BLD AUTO: 1.8 %
ERYTHROCYTE [DISTWIDTH] IN BLOOD BY AUTOMATED COUNT: 13.7 % (ref 11–15)
FOLATE SERPL-MCNC: 16.8 NG/ML
FT4I SERPL CALC-MCNC: 1.8 (ref 1.4–3.8)
GLUCOSE SERPL-MCNC: 95 MG/DL (ref 65–99)
HCT VFR BLD AUTO: 37.2 % (ref 35–45)
HDLC SERPL-MCNC: 74 MG/DL
HGB BLD-MCNC: 11.9 G/DL (ref 11.7–15.5)
IRON SATN MFR SERPL: 19 % (CALC) (ref 16–45)
IRON SERPL-MCNC: 65 MCG/DL (ref 45–160)
LABORATORY COMMENT REPORT: ABNORMAL
LDLC SERPL CALC-MCNC: 160 MG/DL (CALC)
LYMPHOCYTES # BLD AUTO: 2411 CELLS/UL (ref 850–3900)
LYMPHOCYTES NFR BLD AUTO: 29.4 %
MCH RBC QN AUTO: 28.7 PG (ref 27–33)
MCHC RBC AUTO-ENTMCNC: 32 G/DL (ref 32–36)
MCV RBC AUTO: 89.9 FL (ref 80–100)
MONOCYTES # BLD AUTO: 615 CELLS/UL (ref 200–950)
MONOCYTES NFR BLD AUTO: 7.5 %
NEUTROPHILS # BLD AUTO: 4961 CELLS/UL (ref 1500–7800)
NEUTROPHILS NFR BLD AUTO: 60.5 %
NONHDLC SERPL-MCNC: 180 MG/DL (CALC)
NUCLEOSOME AB SER IA-ACNC: ABNORMAL AI
PLATELET # BLD AUTO: 340 THOUSAND/UL (ref 140–400)
PMV BLD REES-ECKER: 9.9 FL (ref 7.5–12.5)
POTASSIUM SERPL-SCNC: 5 MMOL/L (ref 3.5–5.3)
PROT SERPL-MCNC: 6.7 G/DL (ref 6.1–8.1)
RBC # BLD AUTO: 4.14 MILLION/UL (ref 3.8–5.1)
RIBOSOMAL P AB SER-ACNC: ABNORMAL AI
SODIUM SERPL-SCNC: 143 MMOL/L (ref 135–146)
T3RU NFR SERPL: 27 % (ref 22–35)
T4 SERPL-MCNC: 6.5 MCG/DL (ref 5.1–11.9)
TIBC SERPL-MCNC: 337 MCG/DL (CALC) (ref 250–450)
TRIGL SERPL-MCNC: 92 MG/DL
TSH SERPL-ACNC: 3.39 MIU/L (ref 0.4–4.5)
VIT B12 SERPL-MCNC: 534 PG/ML (ref 200–1100)
WBC # BLD AUTO: 8.2 THOUSAND/UL (ref 3.8–10.8)

## 2025-07-14 ENCOUNTER — APPOINTMENT (OUTPATIENT)
Dept: RADIOLOGY | Facility: CLINIC | Age: 75
End: 2025-07-14
Payer: MEDICARE

## 2025-07-14 DIAGNOSIS — R10.31 RIGHT LOWER QUADRANT ABDOMINAL PAIN: ICD-10-CM

## 2025-07-14 PROCEDURE — 74177 CT ABD & PELVIS W/CONTRAST: CPT | Performed by: RADIOLOGY

## 2025-07-14 PROCEDURE — 2550000001 HC RX 255 CONTRASTS: Performed by: FAMILY MEDICINE

## 2025-07-14 PROCEDURE — 74177 CT ABD & PELVIS W/CONTRAST: CPT

## 2025-07-14 RX ADMIN — IOHEXOL 75 ML: 350 INJECTION, SOLUTION INTRAVENOUS at 14:59

## 2025-07-16 ENCOUNTER — APPOINTMENT (OUTPATIENT)
Dept: DERMATOLOGY | Facility: CLINIC | Age: 75
End: 2025-07-16
Payer: MEDICARE

## 2025-07-16 DIAGNOSIS — Z51.89 VISIT FOR WOUND CHECK: ICD-10-CM

## 2025-07-16 PROCEDURE — 99024 POSTOP FOLLOW-UP VISIT: CPT | Performed by: DERMATOLOGY

## 2025-07-16 NOTE — PROGRESS NOTES
Office Follow Up Note    Visit Summary  Chief Complaint    1. Complaint Wound check.    Therese Davison is a 74 y.o. female who presents for 4 week follow up after surgery for a basal cell carcinoma. The patient has minor concerns about the lingering lumpiness of the suture line.    Location Operation site location: neck - anterior    On exam,  Ms. Davison is well-appearing and in no apparent distress. The surgical site appears clean with minimal to no erythema. No tenderness and good wound edge apposition.    Assessment and Plan:  The patient was reassured that the wound is healing appropriately.   Pt reassured lumpiness will continue to resolve with time, probably 6-8 weeks.    The patient was advised on the importance of routine skin monitoring including follow up with general dermatology and instructed to call with any further concerns. The patient will return as needed.     Angelita GONZALEZ RN  am scribing for, and in the presence of Luis Enrique Latham MD PhD    Luis Enrique GONZALEZ MD, PhD, personally performed the services described in the documentation as scribed by Angelita Perez RN in my presence, and confirm it is both accurate and complete.

## 2025-07-17 ENCOUNTER — TELEMEDICINE (OUTPATIENT)
Dept: PRIMARY CARE | Facility: CLINIC | Age: 75
End: 2025-07-17
Payer: MEDICARE

## 2025-07-17 DIAGNOSIS — E78.2 MIXED HYPERLIPIDEMIA: ICD-10-CM

## 2025-07-17 DIAGNOSIS — M54.16 LUMBAR RADICULOPATHY: ICD-10-CM

## 2025-07-17 DIAGNOSIS — R10.84 GENERALIZED ABDOMINAL PAIN: Primary | ICD-10-CM

## 2025-07-17 DIAGNOSIS — D37.8 NEOPLASM OF UNCERTAIN BEHAVIOR OF OTHER SPECIFIED DIGESTIVE ORGANS: ICD-10-CM

## 2025-07-17 DIAGNOSIS — M15.0 PRIMARY OSTEOARTHRITIS INVOLVING MULTIPLE JOINTS: ICD-10-CM

## 2025-07-17 DIAGNOSIS — M81.0 OSTEOPOROSIS OF MULTIPLE SITES: ICD-10-CM

## 2025-07-17 DIAGNOSIS — R93.5 ABNORMAL CT OF THE ABDOMEN: ICD-10-CM

## 2025-07-17 DIAGNOSIS — J45.40 MODERATE PERSISTENT ASTHMA WITHOUT COMPLICATION (HHS-HCC): ICD-10-CM

## 2025-07-17 PROCEDURE — 99212 OFFICE O/P EST SF 10 MIN: CPT | Performed by: FAMILY MEDICINE

## 2025-07-17 PROCEDURE — 1160F RVW MEDS BY RX/DR IN RCRD: CPT | Performed by: FAMILY MEDICINE

## 2025-07-17 PROCEDURE — 1158F ADVNC CARE PLAN TLK DOCD: CPT | Performed by: FAMILY MEDICINE

## 2025-07-17 PROCEDURE — 1159F MED LIST DOCD IN RCRD: CPT | Performed by: FAMILY MEDICINE

## 2025-07-17 ASSESSMENT — ENCOUNTER SYMPTOMS
CONFUSION: 0
DIARRHEA: 0
HEMATURIA: 0
FLANK PAIN: 0
SEIZURES: 0
SPEECH DIFFICULTY: 0
PHOTOPHOBIA: 0
CONSTIPATION: 0
DYSURIA: 0
HEADACHES: 0
SORE THROAT: 0
RECTAL PAIN: 0
AGITATION: 0
ABDOMINAL DISTENTION: 0
POLYPHAGIA: 0
PALPITATIONS: 0
MYALGIAS: 0
NECK STIFFNESS: 0
DIZZINESS: 0
SLEEP DISTURBANCE: 0
NERVOUS/ANXIOUS: 0
CONSTITUTIONAL NEGATIVE: 1
POLYDIPSIA: 0
CHEST TIGHTNESS: 0
ACTIVITY CHANGE: 0
SINUS PRESSURE: 0
SHORTNESS OF BREATH: 0
STRIDOR: 0
BLOOD IN STOOL: 0
FATIGUE: 0
APPETITE CHANGE: 0
RHINORRHEA: 0
DECREASED CONCENTRATION: 0
FEVER: 0
ABDOMINAL PAIN: 0
SINUS PAIN: 0
TROUBLE SWALLOWING: 0
COUGH: 0
DYSPHORIC MOOD: 0
ARTHRALGIAS: 0
ADENOPATHY: 0
EYE PAIN: 0
COLOR CHANGE: 0

## 2025-07-17 NOTE — PROGRESS NOTES
Subjective   Patient ID: Therese Davison is a 74 y.o. female who presents for Results (Discuss results of blood work and CT scan.).      Virtual or Telephone Consent    While technically available, the patient was unable or unwilling to consent to connect via audio/video telehealth technology; therefore, I performed this visit using a real-time audio only connection between Therese Davison & Lonnie Medina DO.  Verbal consent was requested and obtained from Therese Davison on this date, 07/20/25 for a telehealth visit and the patient's location was confirmed at the time of the visit.     History of Present Illness  The patient is a 74-year-old  female who comes in for review of lab results and a CT scan.    She has reviewed her lab results, which were mostly normal except for the antibody test that came in after her last visit. She is concerned about the possibility of lupus, as she was informed that her results were abnormal. She reports no family history of lupus. The antibody titer was low at 1:80, and the DNA screen was fairly negative. She reports no skin changes or rashes.    She experiences occasional muscle stiffness and joint discomfort, which are alleviated by Kenalog injections.    She has undergone a CT scan and is open to the possibility of an MRI. She does not suffer from claustrophobia and has previously undergone MRIs without issue. She has also had a CT scan with contrast dye and radiation exposure in the past. She reports no family history of gallbladder issues. The MRI is expected to provide more information regarding her kidney and gallbladder.    FAMILY HISTORY  She reports no family history of lupus or gallbladder problems.    Review of Systems   Constitutional: Negative.  Negative for activity change, appetite change, fatigue and fever.   HENT:  Negative for congestion, dental problem, ear discharge, ear pain, mouth sores, rhinorrhea, sinus pressure, sinus pain, sore  throat, tinnitus and trouble swallowing.    Eyes:  Negative for photophobia, pain and visual disturbance.   Respiratory:  Negative for cough, chest tightness, shortness of breath and stridor.    Cardiovascular:  Negative for chest pain and palpitations.   Gastrointestinal:  Negative for abdominal distention, abdominal pain, blood in stool, constipation, diarrhea and rectal pain.   Endocrine: Negative for cold intolerance, heat intolerance, polydipsia, polyphagia and polyuria.   Genitourinary:  Negative for dysuria, flank pain, hematuria and urgency.   Musculoskeletal:  Negative for arthralgias, gait problem, myalgias and neck stiffness.   Skin:  Negative for color change and rash.   Allergic/Immunologic: Negative for environmental allergies and food allergies.   Neurological:  Negative for dizziness, seizures, syncope, speech difficulty and headaches.   Hematological:  Negative for adenopathy.   Psychiatric/Behavioral:  Negative for agitation, confusion, decreased concentration, dysphoric mood and sleep disturbance. The patient is not nervous/anxious.        Objective     There were no vitals taken for this visit.     Physical Exam  Alert, oriented x 3, affect pleasant    Problem List Items Addressed This Visit       Asthma    Lumbar radiculopathy    Mixed hyperlipidemia    Osteoporosis of multiple sites    Osteoarthritis, multiple sites     Other Visit Diagnoses         Generalized abdominal pain    -  Primary             Assessment & Plan  1. Abnormal antibody test.  - Antibody titer was low at 1:80, which is not convincing for lupus.  - DNA screen was negative, and antibody studies were unremarkable.  - No rashes or skin changes reported.  - Possibility of lupus, polyarthritis, or multicentric syndrome discussed; no further action required at this time.    2. Joint stiffness.  - Experiences muscle stiffness and occasional joint pain.  - Symptoms improve with Kenalog injections.  - No significant concerns  noted.    3. Gallbladder and kidney issues.  - CT scan performed; MRI recommended for further evaluation.  - MRI will be scheduled to assess gallbladder and kidney, with and without contrast if necessary.  - CA 19-9 blood test ordered; nonfasting, can be done at her convenience.    Lonnie Medina,      This medical note was created with the assistance of artificial intelligence (AI) for documentation purposes. The content has been reviewed and confirmed by the healthcare provider for accuracy and completeness. Patient consented to the use of audio recording and use of AI during their visit.

## 2025-07-22 ENCOUNTER — TELEPHONE (OUTPATIENT)
Dept: PRIMARY CARE | Facility: CLINIC | Age: 75
End: 2025-07-22
Payer: MEDICARE

## 2025-07-22 NOTE — TELEPHONE ENCOUNTER
PT OF [facility]     PT CALLED IN AND STATED [facility] CALLED HER A FEW DAYS AGO DISCUSSING HER HAVING A MRI FOR HER GALLBLADDER AND BLOOD WORK. PT IS UNSURE OF THE BLOOD WORK THAT IS NEEDED. COULD WE CONFIRM WITH [facility] ON THIS INFORMATION?      PLEASE ADVISE

## 2025-07-24 LAB — CANCER AG19-9 SERPL-ACNC: 11 U/ML

## 2025-08-05 ENCOUNTER — HOSPITAL ENCOUNTER (OUTPATIENT)
Dept: RADIOLOGY | Facility: HOSPITAL | Age: 75
Discharge: HOME | End: 2025-08-05
Payer: MEDICARE

## 2025-08-05 DIAGNOSIS — R10.84 GENERALIZED ABDOMINAL PAIN: ICD-10-CM

## 2025-08-05 DIAGNOSIS — R93.5 ABNORMAL CT OF THE ABDOMEN: ICD-10-CM

## 2025-08-05 PROCEDURE — 2550000001 HC RX 255 CONTRASTS: Performed by: FAMILY MEDICINE

## 2025-08-05 PROCEDURE — 74183 MRI ABD W/O CNTR FLWD CNTR: CPT

## 2025-08-05 PROCEDURE — A9575 INJ GADOTERATE MEGLUMI 0.1ML: HCPCS | Performed by: FAMILY MEDICINE

## 2025-08-05 RX ORDER — GADOTERATE MEGLUMINE 376.9 MG/ML
20 INJECTION INTRAVENOUS
Status: COMPLETED | OUTPATIENT
Start: 2025-08-05 | End: 2025-08-05

## 2025-08-05 RX ADMIN — GADOTERATE MEGLUMINE 20 ML: 376.9 INJECTION INTRAVENOUS at 11:42

## 2025-08-11 ENCOUNTER — APPOINTMENT (OUTPATIENT)
Dept: PRIMARY CARE | Facility: CLINIC | Age: 75
End: 2025-08-11
Payer: MEDICARE

## 2025-08-11 VITALS
RESPIRATION RATE: 19 BRPM | SYSTOLIC BLOOD PRESSURE: 126 MMHG | TEMPERATURE: 97.5 F | HEIGHT: 65 IN | DIASTOLIC BLOOD PRESSURE: 80 MMHG | BODY MASS INDEX: 36.99 KG/M2 | HEART RATE: 68 BPM | WEIGHT: 222 LBS | OXYGEN SATURATION: 97 %

## 2025-08-11 DIAGNOSIS — M35.3 POLYMYALGIA (MULTI): ICD-10-CM

## 2025-08-11 DIAGNOSIS — E78.2 MIXED HYPERLIPIDEMIA: ICD-10-CM

## 2025-08-11 DIAGNOSIS — M15.0 PRIMARY OSTEOARTHRITIS INVOLVING MULTIPLE JOINTS: ICD-10-CM

## 2025-08-11 DIAGNOSIS — K81.1 CHRONIC CHOLECYSTITIS: ICD-10-CM

## 2025-08-11 DIAGNOSIS — J45.40 MODERATE PERSISTENT ASTHMA WITHOUT COMPLICATION (HHS-HCC): Primary | ICD-10-CM

## 2025-08-11 DIAGNOSIS — J30.2 SEASONAL ALLERGIES: ICD-10-CM

## 2025-08-11 DIAGNOSIS — G25.81 RLS (RESTLESS LEGS SYNDROME): ICD-10-CM

## 2025-08-11 PROCEDURE — 99213 OFFICE O/P EST LOW 20 MIN: CPT | Performed by: FAMILY MEDICINE

## 2025-08-11 PROCEDURE — G2211 COMPLEX E/M VISIT ADD ON: HCPCS | Performed by: FAMILY MEDICINE

## 2025-08-11 RX ORDER — ROPINIROLE 0.25 MG/1
0.25 TABLET, FILM COATED ORAL NIGHTLY
Qty: 30 TABLET | Refills: 1 | Status: SHIPPED | OUTPATIENT
Start: 2025-08-11 | End: 2026-08-11

## 2025-08-11 ASSESSMENT — PATIENT HEALTH QUESTIONNAIRE - PHQ9
SUM OF ALL RESPONSES TO PHQ9 QUESTIONS 1 AND 2: 0
1. LITTLE INTEREST OR PLEASURE IN DOING THINGS: NOT AT ALL
2. FEELING DOWN, DEPRESSED OR HOPELESS: NOT AT ALL

## 2025-08-11 ASSESSMENT — ENCOUNTER SYMPTOMS
OCCASIONAL FEELINGS OF UNSTEADINESS: 0
DEPRESSION: 0
LOSS OF SENSATION IN FEET: 0

## 2025-08-13 ENCOUNTER — TELEPHONE (OUTPATIENT)
Dept: PRIMARY CARE | Facility: CLINIC | Age: 75
End: 2025-08-13
Payer: MEDICARE

## 2025-08-13 ENCOUNTER — APPOINTMENT (OUTPATIENT)
Dept: RADIOLOGY | Facility: CLINIC | Age: 75
End: 2025-08-13
Payer: MEDICARE

## 2025-11-12 ENCOUNTER — APPOINTMENT (OUTPATIENT)
Dept: PRIMARY CARE | Facility: CLINIC | Age: 75
End: 2025-11-12
Payer: MEDICARE

## 2026-04-06 ENCOUNTER — APPOINTMENT (OUTPATIENT)
Dept: DERMATOLOGY | Facility: CLINIC | Age: 76
End: 2026-04-06
Payer: MEDICARE